# Patient Record
Sex: MALE | Race: ASIAN | Employment: OTHER | URBAN - METROPOLITAN AREA
[De-identification: names, ages, dates, MRNs, and addresses within clinical notes are randomized per-mention and may not be internally consistent; named-entity substitution may affect disease eponyms.]

---

## 2017-02-26 DIAGNOSIS — I10 ESSENTIAL HYPERTENSION WITH GOAL BLOOD PRESSURE LESS THAN 140/90: Primary | ICD-10-CM

## 2017-02-26 NOTE — TELEPHONE ENCOUNTER
Synthroid 88mcg     Last Written Prescription Date: 01-  Last Quantity: 90, # refills: 3  Last Office Visit with [facility], [facility] or [facility] prescribing provider: 10-        TSH   Date Value Ref Range Status   06/02/2016 0.81 0.40 - 4.00 mU/L Final     Aspirin      Last Written Prescription Date: 10-  Last Fill Quantity: 90,  # refills: 3   Last Office Visit with [facility], [facility] or [facility] prescribing provider: 10-    If this refill is appropriate for the patient, please send a new RX; if this is not appropriate or the patient needs to be seen, please call the patient.  Thank you  Tonya Peck CPht    [facility]  Phone: [phone]  Fax: [phone]

## 2017-02-27 DIAGNOSIS — E03.9 HYPOTHYROIDISM: ICD-10-CM

## 2017-02-27 NOTE — TELEPHONE ENCOUNTER
aspirin  MG tablet      Last Written Prescription Date: 10/20/15  Last Fill Quantity: 90, # refills: 3  Last Office Visit with [facility], [facility] or [facility] prescribing provider: 10/20/16        BP Readings from Last 3 Encounters:   10/20/16 136/70   06/02/16 104/65   11/12/15 133/82     Lab Results   Component Value Date    AST 34 06/02/2016     Lab Results   Component Value Date    ALT 31 06/02/2016     Creatinine   Date Value Ref Range Status   06/02/2016 1.29 (H) 0.66 - 1.25 mg/dL Final       levothyroxine (SYNTHROID, LEVOTHROID) 88 MCG tablet     Last Written Prescription Date: 01/21/16  Last Quantity: 90, # refills: 3  Last Office Visit with [facility], [facility] or [facility] prescribing provider: 10/20/16        TSH   Date Value Ref Range Status   06/02/2016 0.81 0.40 - 4.00 mU/L Final         [facility]

## 2017-02-28 RX ORDER — LEVOTHYROXINE SODIUM 88 UG/1
88 TABLET ORAL DAILY
Qty: 90 TABLET | Refills: 1 | Status: SHIPPED | OUTPATIENT
Start: 2017-02-28 | End: 2017-05-10

## 2017-02-28 NOTE — TELEPHONE ENCOUNTER
Routing refill request to provider for review/approval because:  A break in medication- asa    Synthroid refilled per protocol.    JOHNATHAN Tellez, Clinical RN [facility].

## 2017-03-01 RX ORDER — LEVOTHYROXINE SODIUM 88 UG/1
TABLET ORAL
Qty: 90 TABLET | Refills: 0 | OUTPATIENT
Start: 2017-03-01

## 2017-04-05 DIAGNOSIS — G20.A1 PD (PARKINSON'S DISEASE) (H): ICD-10-CM

## 2017-04-05 NOTE — TELEPHONE ENCOUNTER
Sinemet      Last Written Prescription Date: 11/12/2015  Last Fill Quantity: 135, # refills: 3  Last Office Visit with [facility], [facility] or [facility] prescribing provider: 11/12/2015        BP Readings from Last 3 Encounters:   10/20/16 136/70   06/02/16 104/65   11/12/15 133/82     Thank you very kindly!  Celia Enamorado [facility] [place]

## 2017-04-07 ENCOUNTER — CARE COORDINATION (OUTPATIENT)
Dept: GERIATRIC MEDICINE | Facility: CLINIC | Age: 82
End: 2017-04-07

## 2017-04-07 RX ORDER — CARBIDOPA AND LEVODOPA 25; 100 MG/1; MG/1
0.5 TABLET ORAL 3 TIMES DAILY
Qty: 135 TABLET | Refills: 0 | Status: SHIPPED | OUTPATIENT
Start: 2017-04-07 | End: 2018-05-31

## 2017-04-07 NOTE — PROGRESS NOTES
Yazmin So,    Client is requesting for adult diapers which I have sent the referral to [facility].  They will be sending you a request for doctor order from you.  If you agree with this request please sign the order and fax it back to [facility].      Thank you,    Farhad Mitchell RN, PHN  [facility]   Phone: [phone]  Fax [phone]  [email or web address]

## 2017-05-01 ENCOUNTER — CARE COORDINATION (OUTPATIENT)
Dept: GERIATRIC MEDICINE | Facility: CLINIC | Age: 82
End: 2017-05-01

## 2017-05-02 NOTE — PROGRESS NOTES
[facility] Six-Month Telephone Assessment    6 month telephone assessment completed on 5/1/2017.    ER visits: No  Hospitalizations: No  TCU stays: No  Significant health status changes: None reported.  Falls/Injuries: No  ADL/IADL changes: No  Changes in services: No    Caregiver Assessment follow up:  NA    Goals: See POC in chart for goal progress documentation.      Will see client in 6 months for an annual health risk assessment.   Encouraged client to call CM with any questions or concerns in the meantime.       Farhad Mitchell RN, PHN  [facility]   Phone: [phone]  Fax [phone]  [email or web address]

## 2017-05-10 ENCOUNTER — OFFICE VISIT (OUTPATIENT)
Dept: FAMILY MEDICINE | Facility: CLINIC | Age: 82
End: 2017-05-10
Payer: COMMERCIAL

## 2017-05-10 VITALS
BODY MASS INDEX: 25.05 KG/M2 | DIASTOLIC BLOOD PRESSURE: 60 MMHG | WEIGHT: 159.6 LBS | SYSTOLIC BLOOD PRESSURE: 140 MMHG | HEIGHT: 67 IN | RESPIRATION RATE: 17 BRPM | HEART RATE: 68 BPM | OXYGEN SATURATION: 98 % | TEMPERATURE: 96.5 F

## 2017-05-10 DIAGNOSIS — K59.00 CONSTIPATION, UNSPECIFIED CONSTIPATION TYPE: ICD-10-CM

## 2017-05-10 DIAGNOSIS — E78.5 HYPERLIPIDEMIA LDL GOAL <100: ICD-10-CM

## 2017-05-10 DIAGNOSIS — R21 RASH: ICD-10-CM

## 2017-05-10 DIAGNOSIS — E03.9 HYPOTHYROIDISM, UNSPECIFIED TYPE: ICD-10-CM

## 2017-05-10 DIAGNOSIS — I10 ESSENTIAL HYPERTENSION WITH GOAL BLOOD PRESSURE LESS THAN 140/90: ICD-10-CM

## 2017-05-10 DIAGNOSIS — H04.123 DRY EYES: ICD-10-CM

## 2017-05-10 DIAGNOSIS — E11.59 TYPE 2 DIABETES MELLITUS WITH OTHER CIRCULATORY COMPLICATIONS (H): Primary | ICD-10-CM

## 2017-05-10 LAB
ALBUMIN SERPL-MCNC: 3.6 G/DL (ref 3.4–5)
ALP SERPL-CCNC: 71 U/L (ref 40–150)
ALT SERPL W P-5'-P-CCNC: 41 U/L (ref 0–70)
ANION GAP SERPL CALCULATED.3IONS-SCNC: 11 MMOL/L (ref 3–14)
AST SERPL W P-5'-P-CCNC: 32 U/L (ref 0–45)
BILIRUB SERPL-MCNC: 1.5 MG/DL (ref 0.2–1.3)
BUN SERPL-MCNC: 20 MG/DL (ref 7–30)
CALCIUM SERPL-MCNC: 9.2 MG/DL (ref 8.5–10.1)
CHLORIDE SERPL-SCNC: 107 MMOL/L (ref 94–109)
CHOLEST SERPL-MCNC: 182 MG/DL
CO2 SERPL-SCNC: 25 MMOL/L (ref 20–32)
CREAT SERPL-MCNC: 1.23 MG/DL (ref 0.66–1.25)
CREAT UR-MCNC: 136 MG/DL
GFR SERPL CREATININE-BSD FRML MDRD: 54 ML/MIN/1.7M2
GLUCOSE SERPL-MCNC: 133 MG/DL (ref 70–99)
HBA1C MFR BLD: 6.6 % (ref 4.3–6)
HDLC SERPL-MCNC: 85 MG/DL
LDLC SERPL CALC-MCNC: 78 MG/DL
MICROALBUMIN UR-MCNC: 31 MG/L
MICROALBUMIN/CREAT UR: 22.65 MG/G CR (ref 0–17)
NONHDLC SERPL-MCNC: 97 MG/DL
POTASSIUM SERPL-SCNC: 4.4 MMOL/L (ref 3.4–5.3)
PROT SERPL-MCNC: 8.1 G/DL (ref 6.8–8.8)
SODIUM SERPL-SCNC: 143 MMOL/L (ref 133–144)
T4 FREE SERPL-MCNC: 0.81 NG/DL (ref 0.76–1.46)
TRIGL SERPL-MCNC: 97 MG/DL
TSH SERPL DL<=0.005 MIU/L-ACNC: 5.77 MU/L (ref 0.4–4)

## 2017-05-10 PROCEDURE — 82043 UR ALBUMIN QUANTITATIVE: CPT | Performed by: FAMILY MEDICINE

## 2017-05-10 PROCEDURE — 84443 ASSAY THYROID STIM HORMONE: CPT | Performed by: FAMILY MEDICINE

## 2017-05-10 PROCEDURE — 83036 HEMOGLOBIN GLYCOSYLATED A1C: CPT | Performed by: FAMILY MEDICINE

## 2017-05-10 PROCEDURE — 99207 C FOOT EXAM  NO CHARGE: CPT | Performed by: FAMILY MEDICINE

## 2017-05-10 PROCEDURE — 80061 LIPID PANEL: CPT | Performed by: FAMILY MEDICINE

## 2017-05-10 PROCEDURE — 99213 OFFICE O/P EST LOW 20 MIN: CPT | Performed by: FAMILY MEDICINE

## 2017-05-10 PROCEDURE — 84439 ASSAY OF FREE THYROXINE: CPT | Performed by: FAMILY MEDICINE

## 2017-05-10 PROCEDURE — 80053 COMPREHEN METABOLIC PANEL: CPT | Performed by: FAMILY MEDICINE

## 2017-05-10 PROCEDURE — 36415 COLL VENOUS BLD VENIPUNCTURE: CPT | Performed by: FAMILY MEDICINE

## 2017-05-10 RX ORDER — AMOXICILLIN 250 MG
1 CAPSULE ORAL 2 TIMES DAILY
Qty: 180 TABLET | Refills: 3 | Status: SHIPPED | OUTPATIENT
Start: 2017-05-10 | End: 2018-05-31

## 2017-05-10 RX ORDER — POLYETHYLENE GLYCOL 3350 17 G/17G
1 POWDER, FOR SOLUTION ORAL DAILY
Qty: 510 G | Refills: 11 | Status: SHIPPED | OUTPATIENT
Start: 2017-05-10 | End: 2019-01-01

## 2017-05-10 RX ORDER — CARBOXYMETHYLCELLULOSE SODIUM 5 MG/ML
1 SOLUTION/ DROPS OPHTHALMIC 3 TIMES DAILY PRN
Qty: 3 BOTTLE | Refills: 3 | Status: SHIPPED | OUTPATIENT
Start: 2017-05-10 | End: 2019-01-01

## 2017-05-10 RX ORDER — TRIAMCINOLONE ACETONIDE 1 MG/G
CREAM TOPICAL
Qty: 30 G | Refills: 3 | Status: SHIPPED | OUTPATIENT
Start: 2017-05-10 | End: 2018-05-31

## 2017-05-10 RX ORDER — LEVOTHYROXINE SODIUM 88 UG/1
88 TABLET ORAL DAILY
Qty: 90 TABLET | Refills: 3 | Status: SHIPPED | OUTPATIENT
Start: 2017-05-10 | End: 2018-05-31

## 2017-05-10 RX ORDER — LISINOPRIL 10 MG/1
10 TABLET ORAL DAILY
Qty: 90 TABLET | Refills: 1 | Status: SHIPPED | OUTPATIENT
Start: 2017-05-10 | End: 2017-10-25

## 2017-05-10 ASSESSMENT — ANXIETY QUESTIONNAIRES
GAD7 TOTAL SCORE: 5
6. BECOMING EASILY ANNOYED OR IRRITABLE: NOT AT ALL
5. BEING SO RESTLESS THAT IT IS HARD TO SIT STILL: SEVERAL DAYS
2. NOT BEING ABLE TO STOP OR CONTROL WORRYING: SEVERAL DAYS
1. FEELING NERVOUS, ANXIOUS, OR ON EDGE: MORE THAN HALF THE DAYS
IF YOU CHECKED OFF ANY PROBLEMS ON THIS QUESTIONNAIRE, HOW DIFFICULT HAVE THESE PROBLEMS MADE IT FOR YOU TO DO YOUR WORK, TAKE CARE OF THINGS AT HOME, OR GET ALONG WITH OTHER PEOPLE: NOT DIFFICULT AT ALL
7. FEELING AFRAID AS IF SOMETHING AWFUL MIGHT HAPPEN: NOT AT ALL
3. WORRYING TOO MUCH ABOUT DIFFERENT THINGS: NOT AT ALL

## 2017-05-10 ASSESSMENT — PAIN SCALES - GENERAL: PAINLEVEL: NO PAIN (0)

## 2017-05-10 ASSESSMENT — PATIENT HEALTH QUESTIONNAIRE - PHQ9: 5. POOR APPETITE OR OVEREATING: SEVERAL DAYS

## 2017-05-10 NOTE — NURSING NOTE
"Chief Complaint   Patient presents with     Recheck Medication     discuss health     no BM's      interrpreter     Corrina with KTTS        Initial /60 (BP Location: Right arm, Patient Position: Chair, Cuff Size: Adult Large)  Pulse 68  Temp 96.5  F (35.8  C) (Oral)  Resp 17  Ht 5' 6.5\" (1.689 m)  Wt 159 lb 9.6 oz (72.4 kg)  SpO2 98%  BMI 25.37 kg/m2 Estimated body mass index is 25.37 kg/(m^2) as calculated from the following:    Height as of this encounter: 5' 6.5\" (1.689 m).    Weight as of this encounter: 159 lb 9.6 oz (72.4 kg).  Medication Reconciliation: complete   Lorelei Patterson CMA      "

## 2017-05-10 NOTE — PROGRESS NOTES
SUBJECTIVE:                                                    Low Valadez is a 89 year old male who presents to clinic today for the following health issues:      Constipation     Onset: days      Description:   Frequency of bowel movements: 24 hours.  Stool consistency: soft    Progression of Symptoms:  same    Accompanying Signs & Symptoms:  Abdominal pain (cramping?): no   Blood in stool: no   Rectal pain: no   Nausea/vomiting: no   Weight loss or gain: no    History:   History of abdominal surgery: no     Precipitating factors:   Recent use of narcotics, anticholinergics, calcium channel blockers, antacids, or iron supplements: no   Chronic Laxative Use: no          Therapies Tried and outcome: prune juice     Discuss pt. hlth       Problem list and histories reviewed & adjusted, as indicated.  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 stenosis of left middle cerebral artery     Essential hypertension with goal blood pressure less than 140/90     Constipation, unspecified constipation type     Hypothyroidism, unspecified type     Past Surgical History:   Procedure Laterality Date     NO HISTORY OF SURGERY         Social History   Substance Use Topics     Smoking status: Never Smoker     Smokeless tobacco: Never Used     Alcohol use No     Family History   Problem Relation Age of Onset     CANCER No family hx of      DIABETES No family hx of      Hypertension No family hx of       "CEREBROVASCULAR DISEASE No family hx of      Glaucoma No family hx of      Macular Degeneration No family hx of      Thyroid Disease Other            Reviewed and updated as needed this visit by clinical staff  Tobacco  Allergies  Meds       Reviewed and updated as needed this visit by Provider         ROS:  Constitutional, HEENT, cardiovascular, pulmonary, GI, , musculoskeletal, neuro, skin, endocrine and psych systems are negative, except as otherwise noted.    OBJECTIVE:                                                    /60 (BP Location: Right arm, Patient Position: Chair, Cuff Size: Adult Large)  Pulse 68  Temp 96.5  F (35.8  C) (Oral)  Resp 17  Ht 5' 6.5\" (1.689 m)  Wt 159 lb 9.6 oz (72.4 kg)  SpO2 98%  BMI 25.37 kg/m2  Body mass index is 25.37 kg/(m^2).  GENERAL: healthy, alert and no distress  NECK: no adenopathy, no asymmetry, masses, or scars and thyroid normal to palpation  RESP: lungs clear to auscultation - no rales, rhonchi or wheezes  CV: regular rate and rhythm, normal S1 S2, no S3 or S4, no murmur, click or rub, no peripheral edema and peripheral pulses strong  ABDOMEN: soft, nontender, no hepatosplenomegaly, no masses and bowel sounds normal  MS: no gross musculoskeletal defects noted, no edema    Diagnostic Test Results:  none      ASSESSMENT/PLAN:                                                      1. Type 2 diabetes mellitus with other circulatory complications (H)  Controlled. RTC in 6 months.  - HEMOGLOBIN A1C  - Comprehensive metabolic panel (BMP + Alb, Alk Phos, ALT, AST, Total. Bili, TP)  - Albumin Random Urine Quantitative  - FOOT EXAM    2. Essential hypertension with goal blood pressure less than 140/90  Controlled. Reviewed low salt diet.  - Comprehensive metabolic panel (BMP + Alb, Alk Phos, ALT, AST, Total. Bili, TP)  - Albumin Random Urine Quantitative  - lisinopril (PRINIVIL/ZESTRIL) 10 MG tablet; Take 1 tablet (10 mg) by mouth daily  Dispense: 90 tablet; Refill: " 1  - levothyroxine (SYNTHROID/LEVOTHROID) 88 MCG tablet; Take 1 tablet (88 mcg) by mouth daily  Dispense: 90 tablet; Refill: 3    3. Hyperlipidemia LDL goal <100  Controlled.  - Lipid Profile with reflex to direct LDL    4. Constipation, unspecified constipation type  Stable.  - polyethylene glycol (MIRALAX) powder; Take 17 g (1 capful) by mouth daily As needed  Dispense: 510 g; Refill: 11  - senna-docusate (SENOKOT-S;PERICOLACE) 8.6-50 MG per tablet; Take 1 tablet by mouth 2 times daily  Dispense: 180 tablet; Refill: 3    5. Hypothyroidism, unspecified type  Euthyroid.  - TSH with free T4 reflex    6. Rash    - triamcinolone (KENALOG) 0.1 % cream; Apply sparingly to affected area three times daily for 14 days.  Dispense: 30 g; Refill: 3    7. Dry eyes    - carboxymethylcellulose (REFRESH PLUS) 0.5 % SOLN ophthalmic solution; Place 1 drop into both eyes 3 times daily as needed for dry eyes  Dispense: 3 Bottle; Refill: 3    See Patient Instructions    Clif So MD, MD  [facility]

## 2017-05-10 NOTE — MR AVS SNAPSHOT
"              After Visit Summary   5/10/2017    Low Valadez    MRN: [id number]           Patient Information     Date Of Birth          2/4/1918        Visit Information        Provider Department      5/10/2017 9:15 AM Clif So MD; [employer] [facility]        Today's Diagnoses     Type 2 diabetes mellitus with other circulatory complications (H)    -  1    Essential hypertension with goal blood pressure less than 140/90        Hyperlipidemia LDL goal <100        Constipation, unspecified constipation type        Hypothyroidism, unspecified type        Rash        Dry eyes           Follow-ups after your visit        Who to contact     If you have questions or need follow up information about today's clinic visit or your schedule please contact [facility] directly at [phone].  Normal or non-critical lab and imaging results will be communicated to you by [employer], letter or phone within 4 business days after the clinic has received the results. If you do not hear from us within 7 days, please contact the clinic through [employer] or phone. If you have a critical or abnormal lab result, we will notify you by phone as soon as possible.  Submit refill requests through [employer] or call your pharmacy and they will forward the refill request to us. Please allow 3 business days for your refill to be completed.          Additional Information About Your Visit        MyChart Information     [employer] lets you send messages to your doctor, view your test results, renew your prescriptions, schedule appointments and more. To sign up, go to [email or web address] . Click on \"Log in\" on the left side of the screen, which will take you to the Welcome page. Then click on \"Sign up Now\" on the right side of the page.     You will be asked to enter the access code listed below, as well as some personal information. Please follow the directions to create your username and " "password.     Your access code is: [email or web address]  Expires: 2017  9:55 AM     Your access code will  in 90 days. If you need help or a new code, please call your [facility] clinic or [phone].        Care EveryWhere ID     This is your Care EveryWhere ID. This could be used by other organizations to access your [facility] medical records  [id number]        Your Vitals Were     Pulse Temperature Respirations Height Pulse Oximetry BMI (Body Mass Index)    68 96.5  F (35.8  C) (Oral) 17 5' 6.5\" (1.689 m) 98% 25.37 kg/m2       Blood Pressure from Last 3 Encounters:   05/10/17 140/60   10/20/16 136/70   16 104/65    Weight from Last 3 Encounters:   05/10/17 159 lb 9.6 oz (72.4 kg)   10/20/16 156 lb 6.4 oz (70.9 kg)   16 156 lb (70.8 kg)              We Performed the Following     Albumin Random Urine Quantitative     Comprehensive metabolic panel (BMP + Alb, Alk Phos, ALT, AST, Total. Bili, TP)     FOOT EXAM     HEMOGLOBIN A1C     Lipid Profile with reflex to direct LDL     TSH with free T4 reflex          Today's Medication Changes          These changes are accurate as of: 5/10/17  9:57 AM.  If you have any questions, ask your nurse or doctor.               Start taking these medicines.        Dose/Directions    carboxymethylcellulose 0.5 % Soln ophthalmic solution   Commonly known as:  REFRESH PLUS   Used for:  Dry eyes   Started by:  Clif So MD        Dose:  1 drop   Place 1 drop into both eyes 3 times daily as needed for dry eyes   Quantity:  3 Bottle   Refills:  3       triamcinolone 0.1 % cream   Commonly known as:  KENALOG   Used for:  Rash   Started by:  Clif So MD        Apply sparingly to affected area three times daily for 14 days.   Quantity:  30 g   Refills:  3            Where to get your medicines      These medications were sent to [facility] - [place], [place] - [address]  [address], [facility] [address]     Phone:  [phone]     " carboxymethylcellulose 0.5 % Soln ophthalmic solution    levothyroxine 88 MCG tablet    lisinopril 10 MG tablet    polyethylene glycol powder    senna-docusate 8.6-50 MG per tablet    triamcinolone 0.1 % cream                Primary Care Provider Office Phone # Fax #    Clif So [phone]       [facility] [address]  [facility] [address]        Thank you!     Thank you for choosing [facility]  for your care. Our goal is always to provide you with excellent care. Hearing back from our patients is one way we can continue to improve our services. Please take a few minutes to complete the written survey that you may receive in the mail after your visit with us. Thank you!             Your Updated Medication List - Protect others around you: Learn how to safely use, store and throw away your medicines at www.disposemymeds.org.          This list is accurate as of: 5/10/17  9:57 AM.  Always use your most recent med list.                   Brand Name Dispense Instructions for use    acetaminophen-codeine 300-30 MG per tablet    TYLENOL #3    60 tablet    Take 1 tablet by mouth every 6 hours as needed for pain       aspirin  MG EC tablet     90 tablet    Take 1 tablet (325 mg) by mouth daily       carbidopa-levodopa  MG per tablet    SINEMET    135 tablet    Take 0.5 tablets by mouth 3 times daily       carboxymethylcellulose 0.5 % Soln ophthalmic solution    REFRESH PLUS    3 Bottle    Place 1 drop into both eyes 3 times daily as needed for dry eyes       levothyroxine 88 MCG tablet    SYNTHROID/LEVOTHROID    90 tablet    Take 1 tablet (88 mcg) by mouth daily       lisinopril 10 MG tablet    PRINIVIL/ZESTRIL    90 tablet    Take 1 tablet (10 mg) by mouth daily       polyethylene glycol powder    MIRALAX    510 g    Take 17 g (1 capful) by mouth daily As needed       senna-docusate 8.6-50 MG per tablet    SENOKOT-S;PERICOLACE    180 tablet    Take 1  tablet by mouth 2 times daily       STATIN NOT PRESCRIBED (INTENTIONAL)     0 each    Statin not prescribed intentionally due to Other not needed due to difficulty swallowing medications (This option does not exclude patient from measure)       triamcinolone 0.1 % cream    KENALOG    30 g    Apply sparingly to affected area three times daily for 14 days.

## 2017-05-11 ASSESSMENT — PATIENT HEALTH QUESTIONNAIRE - PHQ9: SUM OF ALL RESPONSES TO PHQ QUESTIONS 1-9: 17

## 2017-05-11 ASSESSMENT — ANXIETY QUESTIONNAIRES: GAD7 TOTAL SCORE: 5

## 2017-07-17 ENCOUNTER — TELEPHONE (OUTPATIENT)
Dept: FAMILY MEDICINE | Facility: CLINIC | Age: 82
End: 2017-07-17

## 2017-07-17 NOTE — LETTER
[address] [address]  Phone: [phone]    July 18, 2017        Low Valadez  [address] [place] [address]          To whom it may concern:    RE: Low Valadez    Patient is currently a patient of mine. I do confirm that patient currently lives at [address] [place] [address].    Please contact me for questions or concerns.      Sincerely,        Clif So MD

## 2017-07-17 NOTE — TELEPHONE ENCOUNTER
Reason for Call:  Other Letter    Detailed comments:   Pt's Daughter calling for she would like Dr. So to write a letter to the IRS stating Pt does live at his address on file from 2014 to present. Pt's son  would like to come to clinic to  as soon as flakoe. Please     Phone Number Patient can be reached at: Cell number on file:  [phone]   Best Time: anytime    Can we leave a detailed message on this number? YES    Call taken on 7/17/2017 at 3:03 PM by Riki Rodríguez

## 2017-07-18 NOTE — TELEPHONE ENCOUNTER
Dr. So I checked in demographics on pt's [place] ID Card that was scanned in 2014, the ID card was issued on  with the address we have in EPIC. You can write a letter/statement for pt to verify that pt was at the address [address] in [facility].  Once letter is complete and signed pls put the letter in TC basket to call pt's son to  from the .  Asaf Valadez,  [facility]

## 2017-07-18 NOTE — TELEPHONE ENCOUNTER
Letter for pt will be deliver to the  this afternoon for pickup after 3pm.  Asaf Valadez,  [facility]    Please call Anibal to let them know the above info.  And remind the person who is picking up to bring their photo ID.  Asaf Valadez,  [facility]     NOTE: If patient/gaurdian calls the clinic before the care teams gets a chance to call them, please let pt know the above information regarding pickup times, remind them to bring a photo ID and close the encounter.  Asaf Valadez,  [facility]    FYI:  Anything completed after 2:00p will not be delivered until the next business day after 11a.   Asaf Valadez,  for [facility].

## 2017-09-05 ENCOUNTER — CARE COORDINATION (OUTPATIENT)
Dept: GERIATRIC MEDICINE | Facility: CLINIC | Age: 82
End: 2017-09-05

## 2017-09-05 NOTE — PROGRESS NOTES
Call placed to client to schedule a home visit appointment to complete the annual HRA and PCA.  A home visit has been scheduled for 9/13/2017 at 10:00 am.     Farhad Mitchell RN, N  [facility]   Phone: [phone]  Fax [phone]  [email or web address]

## 2017-09-13 ENCOUNTER — CARE COORDINATION (OUTPATIENT)
Dept: GERIATRIC MEDICINE | Facility: CLINIC | Age: 82
End: 2017-09-13

## 2017-09-13 ASSESSMENT — PATIENT HEALTH QUESTIONNAIRE - PHQ9: SUM OF ALL RESPONSES TO PHQ QUESTIONS 1-9: 9

## 2017-09-19 ENCOUNTER — CARE COORDINATION (OUTPATIENT)
Dept: GERIATRIC MEDICINE | Facility: CLINIC | Age: 82
End: 2017-09-19

## 2017-09-19 NOTE — PROGRESS NOTES
[facility]:  [facility]  Faxed copy of PCA assessment to PCA Agency and mailed copy to member.  Faxed MD Communication to PCP.       Angeles Talbert  Case Management Specialist  [facility]   [phone]

## 2017-10-12 ENCOUNTER — CARE COORDINATION (OUTPATIENT)
Dept: GERIATRIC MEDICINE | Facility: CLINIC | Age: 82
End: 2017-10-12

## 2017-10-12 NOTE — PROGRESS NOTES
Received after visit chart from care coordinator.  Completed following tasks: Mailed copy of care plan to client  Updated services in access  Entered MMIS  Chart was returned to CC.     Angeles Talbert  Case Management Specialist  [facility]   [phone]

## 2017-10-12 NOTE — PROGRESS NOTES
Home visit/Papa Risk Assessment/EW screening completed on: 9/13/2017.    Member resides: in a split entry home with sonAnibal and his family (multi-generational).  Member Mood/behavior-PHQ9 score: 9 [0-9: No Major Depression]  See EMR for a list of client's diagnoses and medications.  Medication management:  Medication reviewed and stated understanding and compliance, family assist with medication set up and medication administration.   [facility]: Shared information regarding the [employer] and [facility]  Fitness Program:  Client not interested at this time.  Member currently receiving the following services: PCA, nutritional supplements and incontinence supply.  Plan of Care: Continue the current services client has in place.   Follow-Up Plan: Member informed of future contact, plan to f/u with member with a 6 month telephone assessment.  Contact information shared with member and family, encouraged member to call with any questions or concerns prior to this.    See health risk assessment form and care plan for further information.    PCA ASSESSMENT:  Writer also completed the annual PCA assessment with a result of 10 hours per day.    Farhad Mitchell RN, PHN  [facility]   Phone: [phone]  Fax [phone]  [email or web address]

## 2017-10-13 ENCOUNTER — CARE COORDINATION (OUTPATIENT)
Dept: GERIATRIC MEDICINE | Facility: CLINIC | Age: 82
End: 2017-10-13

## 2017-10-13 NOTE — PROGRESS NOTES
Order placed with [employer] (p: [phone]; f: [phone]) for 42 bottles of glucerna.  Order placed on 10/13/2017. Access updated.  As required, authorization submitted to health plan.    Angeles Talbert  Case Management Specialist  [facility]   [phone]

## 2017-10-25 ENCOUNTER — OFFICE VISIT (OUTPATIENT)
Dept: FAMILY MEDICINE | Facility: CLINIC | Age: 82
End: 2017-10-25
Payer: COMMERCIAL

## 2017-10-25 VITALS
SYSTOLIC BLOOD PRESSURE: 107 MMHG | OXYGEN SATURATION: 97 % | DIASTOLIC BLOOD PRESSURE: 57 MMHG | HEART RATE: 67 BPM | TEMPERATURE: 98.7 F

## 2017-10-25 DIAGNOSIS — E11.9 TYPE 2 DIABETES MELLITUS WITHOUT COMPLICATION, WITHOUT LONG-TERM CURRENT USE OF INSULIN (H): Primary | ICD-10-CM

## 2017-10-25 DIAGNOSIS — Z23 ENCOUNTER FOR IMMUNIZATION: ICD-10-CM

## 2017-10-25 DIAGNOSIS — E78.5 HYPERLIPIDEMIA LDL GOAL <100: ICD-10-CM

## 2017-10-25 DIAGNOSIS — I10 ESSENTIAL HYPERTENSION WITH GOAL BLOOD PRESSURE LESS THAN 140/90: ICD-10-CM

## 2017-10-25 LAB — HBA1C MFR BLD: 6.2 % (ref 4.3–6)

## 2017-10-25 PROCEDURE — 83036 HEMOGLOBIN GLYCOSYLATED A1C: CPT | Performed by: FAMILY MEDICINE

## 2017-10-25 PROCEDURE — G0008 ADMIN INFLUENZA VIRUS VAC: HCPCS | Performed by: FAMILY MEDICINE

## 2017-10-25 PROCEDURE — 90662 IIV NO PRSV INCREASED AG IM: CPT | Performed by: FAMILY MEDICINE

## 2017-10-25 PROCEDURE — G0009 ADMIN PNEUMOCOCCAL VACCINE: HCPCS | Performed by: FAMILY MEDICINE

## 2017-10-25 PROCEDURE — 36415 COLL VENOUS BLD VENIPUNCTURE: CPT | Performed by: FAMILY MEDICINE

## 2017-10-25 PROCEDURE — 90670 PCV13 VACCINE IM: CPT | Performed by: FAMILY MEDICINE

## 2017-10-25 PROCEDURE — 99214 OFFICE O/P EST MOD 30 MIN: CPT | Mod: 25 | Performed by: FAMILY MEDICINE

## 2017-10-25 RX ORDER — LISINOPRIL 10 MG/1
10 TABLET ORAL DAILY
Qty: 90 TABLET | Refills: 1 | Status: SHIPPED | OUTPATIENT
Start: 2017-10-25 | End: 2018-05-31

## 2017-10-25 NOTE — PATIENT INSTRUCTIONS
At [facility], we strive to deliver an exceptional experience to you, every time we see you.  If you receive a survey in the mail, please send us back your thoughts. We really do value your feedback.    Based on your medical history, these are the current health maintenance/preventive care services that you are due for (some may have been done at this visit.)  Health Maintenance Due   Topic Date Due     PNEUMOCOCCAL (2 of 2 - PCV13) 03/24/2010     EYE EXAM Q1 YEAR  10/22/2013     ADVANCE DIRECTIVE PLANNING Q5 YRS  10/04/2016     INFLUENZA VACCINE (SYSTEM ASSIGNED)  09/01/2017     A1C Q6 MO  11/10/2017         Suggested websites for health information:  Www.DwellAware.org : Up to date and easily searchable information on multiple topics.  Www.[place].gov : medication info, interactive tutorials, watch real surgeries online  Www.familydoctor.org : good info from the Academy of Family Physicians  Www.cdc.gov : public health info, travel advisories, epidemics (H1N1)  Www.aap.org : children's health info, normal development, vaccinations  Www.health.[facility].[place].us : [place] dept of health, public health issues in [place], N1N1    Your care team:                            Family Medicine Internal Medicine   MD Bishop Otto MD Shantel Branch-Fleming, MD Katya Georgiev PA-C Nam Ho, MD Pediatrics   LESLIE Santiago, MD Dinorah Sequeira CNP, MD Deborah Mielke, MD Kim Thein, APRN [facility]      Clinic hours: Monday - Thursday 7 am-7 pm; Fridays 7 am-5 pm.   Urgent care: Monday - Friday 11 am-9 pm; Saturday and Sunday 9 am-5 pm.  Pharmacy : Monday -Thursday 8 am-8 pm; Friday 8 am-6 pm; Saturday and Sunday 9 am-5 pm.     Clinic: [phone]   Pharmacy: [phone]

## 2017-10-25 NOTE — MR AVS SNAPSHOT
After Visit Summary   10/25/2017    Low Valadez    MRN: [id number]           Patient Information     Date Of Birth          2/4/1918        Visit Information        Provider Department      10/25/2017 10:15 AM Clif So MD; [employer] [facility]        Today's Diagnoses     Type 2 diabetes mellitus without complication, without long-term current use of insulin (H)    -  1    Essential hypertension with goal blood pressure less than 140/90        Hyperlipidemia LDL goal <100        Encounter for immunization          Care Instructions    At [facility], we strive to deliver an exceptional experience to you, every time we see you.  If you receive a survey in the mail, please send us back your thoughts. We really do value your feedback.    Based on your medical history, these are the current health maintenance/preventive care services that you are due for (some may have been done at this visit.)  Health Maintenance Due   Topic Date Due     PNEUMOCOCCAL (2 of 2 - PCV13) 03/24/2010     EYE EXAM Q1 YEAR  10/22/2013     ADVANCE DIRECTIVE PLANNING Q5 YRS  10/04/2016     INFLUENZA VACCINE (SYSTEM ASSIGNED)  09/01/2017     A1C Q6 MO  11/10/2017         Suggested websites for health information:  Www.[employer] : Up to date and easily searchable information on multiple topics.  Www.medlineplus.gov : medication info, interactive tutorials, watch real surgeries online  Www.familydoctor.org : good info from the Academy of Family Physicians  Www.cdc.gov : public health info, travel advisories, epidemics (H1N1)  Www.aap.org : children's health info, normal development, vaccinations  Www.health.state[place].us : [place] dept of health, public health issues in [place], N1N1    Your care team:                            Family Medicine Internal Medicine   MD Bishop Otto MD Shantel Branch-Fleming, MD Katya Georgiev PA-C Nam Ho, MD Pediatrics   Zeeshan  "LESLIE Terrazas, MD Dinorah Sequeira CNP, MD Deborah Mielke, MD Kim Thein, JACINTO [facility]      Clinic hours: Monday - Thursday 7 am-7 pm;  7 am-5 pm.   Urgent care: Monday - Friday 11 am-9 pm; Saturday and  9 am-5 pm.  Pharmacy : Monday -Thursday 8 am-8 pm; Friday 8 am-6 pm; Saturday and  9 am-5 pm.     Clinic: [phone]   Pharmacy: [phone]            Follow-ups after your visit        Who to contact     If you have questions or need follow up information about today's clinic visit or your schedule please contact [facility] [place] directly at [phone].  Normal or non-critical lab and imaging results will be communicated to you by MyChart, letter or phone within 4 business days after the clinic has received the results. If you do not hear from us within 7 days, please contact the clinic through [employer] or phone. If you have a critical or abnormal lab result, we will notify you by phone as soon as possible.  Submit refill requests through [employer] or call your pharmacy and they will forward the refill request to us. Please allow 3 business days for your refill to be completed.          Additional Information About Your Visit        [employer] Information     [employer] lets you send messages to your doctor, view your test results, renew your prescriptions, schedule appointments and more. To sign up, go to [email or web address] . Click on \"Log in\" on the left side of the screen, which will take you to the Welcome page. Then click on \"Sign up Now\" on the right side of the page.     You will be asked to enter the access code listed below, as well as some personal information. Please follow the directions to create your username and password.     Your access code is: [email or web address]  Expires: 2018 10:35 AM     Your access code will  in 90 days. If you need help or a new code, please call your [facility] or " [phone].        Care EveryWhere ID     This is your Care EveryWhere ID. This could be used by other organizations to access your [place] medical records  [id number]        Your Vitals Were     Pulse Temperature Pulse Oximetry             67 98.7  F (37.1  C) (Tympanic) 97%          Blood Pressure from Last 3 Encounters:   10/25/17 107/57   05/10/17 140/60   10/20/16 136/70    Weight from Last 3 Encounters:   05/10/17 159 lb 9.6 oz (72.4 kg)   10/20/16 156 lb 6.4 oz (70.9 kg)   06/02/16 156 lb (70.8 kg)              We Performed the Following     ADMIN 1st VACCINE     FLU VACCINE, INCREASED ANTIGEN, PRESV FREE     HEMOGLOBIN A1C     JUST IN CASE     Pneumococcal vaccine 13 valent PCV13 IM (Prevnar) [59514]          Where to get your medicines      These medications were sent to [facility] - [place], [place] - [address]  [address], [facility] [address]     Phone:  [phone]     lisinopril 10 MG tablet          Primary Care Provider Office Phone # Fax #    Clif So [phone]       [address]  [facility] [address]        Equal Access to Services     KWESI SIMS : Hadii merna ku hadasho Soomaali, waaxda luqadaha, qaybta kaalmada adeegyada, waxay idiin hayalessandro oneill. So [facility] [phone].    ATENCIÓN: Si habla español, tiene a chandler disposición servicios gratuitos de asistencia lingüística. Llame al [phone].    We comply with applicable federal civil rights laws and [place] laws. We do not discriminate on the basis of race, color, national origin, age, disability, sex, sexual orientation, or gender identity.            Thank you!     Thank you for choosing [facility]  for your care. Our goal is always to provide you with excellent care. Hearing back from our patients is one way we can continue to improve our services. Please take a few minutes to complete the written survey that you may receive in the mail  after your visit with us. Thank you!             Your Updated Medication List - Protect others around you: Learn how to safely use, store and throw away your medicines at www.disposemymeds.org.          This list is accurate as of: 10/25/17 10:36 AM.  Always use your most recent med list.                   Brand Name Dispense Instructions for use Diagnosis    acetaminophen-codeine 300-30 MG per tablet    TYLENOL #3    60 tablet    Take 1 tablet by mouth every 6 hours as needed for pain    Abdominal pain, RLQ (right lower quadrant)       aspirin  MG EC tablet     90 tablet    Take 1 tablet (325 mg) by mouth daily    Essential hypertension with goal blood pressure less than 140/90       carbidopa-levodopa  MG per tablet    SINEMET    135 tablet    Take 0.5 tablets by mouth 3 times daily    PD (Parkinson's disease) (H)       carboxymethylcellulose 0.5 % Soln ophthalmic solution    REFRESH PLUS    3 Bottle    Place 1 drop into both eyes 3 times daily as needed for dry eyes    Dry eyes       levothyroxine 88 MCG tablet    SYNTHROID/LEVOTHROID    90 tablet    Take 1 tablet (88 mcg) by mouth daily    Hypothyroidism, unspecified type       lisinopril 10 MG tablet    PRINIVIL/ZESTRIL    90 tablet    Take 1 tablet (10 mg) by mouth daily    Essential hypertension with goal blood pressure less than 140/90       polyethylene glycol powder    MIRALAX    510 g    Take 17 g (1 capful) by mouth daily As needed    Constipation, unspecified constipation type       senna-docusate 8.6-50 MG per tablet    SENOKOT-S;PERICOLACE    180 tablet    Take 1 tablet by mouth 2 times daily    Constipation, unspecified constipation type       STATIN NOT PRESCRIBED (INTENTIONAL)     0 each    Statin not prescribed intentionally due to Other not needed due to difficulty swallowing medications (This option does not exclude patient from measure)        triamcinolone 0.1 % cream    KENALOG    30 g    Apply sparingly to affected area three  times daily for 14 days.    Rash

## 2017-10-25 NOTE — NURSING NOTE
Injectable Influenza Immunization Documentation    1.  Are you sick today? (Fever of 100.5 or higher on the day of the clinic)   No    2.  Have you ever had Guillain-Madison Syndrome within 6 weeks of an influenza vaccionation?  No    3. Do you have a life-threatening allergy to eggs?  No    4. Do you have a life-threatening allergy to a component of the vaccine? May include antibiotics, gelatin or latex.  No     5. Have you ever had a reaction to a dose of flu vaccine that needed immediate medical attention?  No     Form completed by Neil Murray MA    Screening Questionnaire for Adult Immunization    Are you sick today?   No   Do you have allergies to medications, food, a vaccine component or latex?   No   Have you ever had a serious reaction after receiving a vaccination?   No   Do you have a long-term health problem with heart disease, lung disease, asthma, kidney disease, metabolic disease (e.g. diabetes), anemia, or other blood disorder?   No   Do you have cancer, leukemia, HIV/AIDS, or any other immune system problem?   No   In the past 3 months, have you taken medications that affect  your immune system, such as prednisone, other steroids, or anticancer drugs; drugs for the treatment of rheumatoid arthritis, Crohn s disease, or psoriasis; or have you had radiation treatments?   No   Have you had a seizure, or a brain or other nervous system problem?   No   During the past year, have you received a transfusion of blood or blood     products, or been given immune (gamma) globulin or antiviral drug?   No   For women: Are you pregnant or is there a chance you could become        pregnant during the next month?   No   Have you received any vaccinations in the past 4 weeks?   No     Immunization questionnaire answers were all negative.         Screening performed by Neil Murray on 10/25/2017 at 10:46 AM.

## 2017-10-25 NOTE — PROGRESS NOTES
SUBJECTIVE:   Low Valadez is a 89 year old male who presents to clinic today for the following health issues:      Diabetes Follow-up      Patient is checking blood sugars: not at all    Diabetic concerns: None     Symptoms of hypoglycemia (low blood sugar): none     Paresthesias (numbness or burning in feet) or sores: No     Date of last diabetic eye exam: DUE    Hyperlipidemia Follow-Up      Rate your low fat/cholesterol diet?: good    Taking statin?  No    Other lipid medications/supplements?:  none    Hypertension Follow-up      Outpatient blood pressures are not being checked.    Low Salt Diet: low salt        Amount of exercise or physical activity: None    Problems taking medications regularly: No    Medication side effects: none    Diet: low salt, low fat/cholesterol and diabetic      Problem list and histories reviewed & adjusted, as indicated.  Additional history: as documented    Patient Active Problem List   Diagnosis     Cholelithiases     Kidney stones     Health Care Home     Advanced directives, counseling/discussion     Parkinsonism (H)     Vitamin D deficiency     Vitamin B12 deficiency (non anemic)     Elevated ferritin level     Light-headedness     Dermatochalasis, mod, ou     Posterior vitreous detachment, ou     Cataract     Hyperlipidemia LDL goal <100     Overweight (BMI 25.0-29.9)     Cerebral infarction due to occlusion or stenosis of left middle cerebral artery     Essential hypertension with goal blood pressure less than 140/90     Constipation, unspecified constipation type     Hypothyroidism, unspecified type     Type 2 diabetes mellitus without complication, without long-term current use of insulin (H)     Past Surgical History:   Procedure Laterality Date     NO HISTORY OF SURGERY         Social History   Substance Use Topics     Smoking status: Never Smoker     Smokeless tobacco: Never Used     Alcohol use No     Family History   Problem Relation Age of Onset     CANCER No family  hx of      DIABETES No family hx of      Hypertension No family hx of      CEREBROVASCULAR DISEASE No family hx of      Glaucoma No family hx of      Macular Degeneration No family hx of      Thyroid Disease Other              Reviewed and updated as needed this visit by clinical staffTobacco  Allergies  Meds       Reviewed and updated as needed this visit by Provider         ROS:  Constitutional, HEENT, cardiovascular, pulmonary, GI, , musculoskeletal, neuro, skin, endocrine and psych systems are negative, except as otherwise noted.      OBJECTIVE:   /57 (BP Location: Left arm, Patient Position: Chair, Cuff Size: Adult Regular)  Pulse 67  Temp 98.7  F (37.1  C) (Tympanic)  SpO2 97%  There is no height or weight on file to calculate BMI.  GENERAL: healthy, alert and no distress  NECK: no adenopathy, no asymmetry, masses, or scars and thyroid normal to palpation  RESP: lungs clear to auscultation - no rales, rhonchi or wheezes  CV: regular rate and rhythm, normal S1 S2, no S3 or S4, no murmur, click or rub, no peripheral edema and peripheral pulses strong  ABDOMEN: soft, nontender, no hepatosplenomegaly, no masses and bowel sounds normal  MS: no gross musculoskeletal defects noted, no edema  Diabetic foot exam: normal DP and PT pulses, no trophic changes or ulcerative lesions and normal sensory exam    Diagnostic Test Results:  none     ASSESSMENT/PLAN:     1. Type 2 diabetes mellitus without complication, without long-term current use of insulin (H)  Controlled. RTC in 6 months for recheck.  - HEMOGLOBIN A1C  - JUST IN CASE    2. Essential hypertension with goal blood pressure less than 140/90  Controlled. Reviewed low salt diet.  - lisinopril (PRINIVIL/ZESTRIL) 10 MG tablet; Take 1 tablet (10 mg) by mouth daily  Dispense: 90 tablet; Refill: 1    3. Hyperlipidemia LDL goal <100  Controlled.    4. Encounter for immunization    - ADMIN 1st VACCINE  - FLU VACCINE, INCREASED ANTIGEN, PRESV FREE  -  Pneumococcal vaccine 13 valent PCV13 IM (Prevnar) [71672]    See Patient Instructions    Clif So MD, MD  [facility]

## 2017-10-25 NOTE — NURSING NOTE
"Chief Complaint   Patient presents with     Diabetes     Hypertension     Lipids       Initial /57 (BP Location: Left arm, Patient Position: Chair, Cuff Size: Adult Regular)  Pulse 67  Temp 98.7  F (37.1  C) (Tympanic)  SpO2 97% Estimated body mass index is 25.37 kg/(m^2) as calculated from the following:    Height as of 5/10/17: 5' 6.5\" (1.689 m).    Weight as of 5/10/17: 159 lb 9.6 oz (72.4 kg).  Medication Reconciliation: complete     Corrina Abbott MA      "

## 2017-10-27 ENCOUNTER — MEDICAL CORRESPONDENCE (OUTPATIENT)
Dept: HEALTH INFORMATION MANAGEMENT | Facility: CLINIC | Age: 82
End: 2017-10-27

## 2017-11-01 NOTE — PROGRESS NOTES
Per Vicky, Glucerna was shipped/delivered on 10/30/2017.    Angeles Talbert  Case Management Specialist  [facility]   [phone]

## 2018-01-01 ENCOUNTER — PATIENT OUTREACH (OUTPATIENT)
Dept: GERIATRIC MEDICINE | Facility: CLINIC | Age: 83
End: 2018-01-01

## 2018-01-01 ASSESSMENT — PATIENT HEALTH QUESTIONNAIRE - PHQ9: SUM OF ALL RESPONSES TO PHQ QUESTIONS 1-9: 12

## 2018-03-29 ENCOUNTER — PATIENT OUTREACH (OUTPATIENT)
Dept: GERIATRIC MEDICINE | Facility: CLINIC | Age: 83
End: 2018-03-29

## 2018-03-29 ASSESSMENT — ACTIVITIES OF DAILY LIVING (ADL)
DEPENDENT_IADLS:: CLEANING;COOKING;LAUNDRY;SHOPPING;MEAL PREPARATION;MEDICATION MANAGEMENT;MONEY MANAGEMENT;TRANSPORATION

## 2018-03-29 NOTE — PROGRESS NOTES
[facility] Care Coordination Contact    [facility] Six-Month Telephone Assessment    6 month telephone assessment completed on 3/29/2018.    ER visits: No  Hospitalizations: No  TCU stays: No  Significant health status changes: None reported.  Falls/Injuries: No  ADL/IADL changes: No  Changes in services: No    Caregiver Assessment follow up:      Goals: See POC in chart for goal progress documentation.      Will see member in 6 months for an annual health risk assessment.   Encouraged member to call CC with any questions or concerns in the meantime.     Farhad Mitchell RN, PHN  [facility]   Phone: [phone]  Fax [phone]  [email or web address]

## 2018-05-20 ENCOUNTER — TRANSFERRED RECORDS (OUTPATIENT)
Dept: HEALTH INFORMATION MANAGEMENT | Facility: CLINIC | Age: 83
End: 2018-05-20

## 2018-05-25 ENCOUNTER — TELEPHONE (OUTPATIENT)
Dept: FAMILY MEDICINE | Facility: CLINIC | Age: 83
End: 2018-05-25

## 2018-05-25 ENCOUNTER — PATIENT OUTREACH (OUTPATIENT)
Dept: GERIATRIC MEDICINE | Facility: CLINIC | Age: 83
End: 2018-05-25

## 2018-05-25 NOTE — PROGRESS NOTES
[facility] Care Coordination Contact    Yazmin So    I am the [facility] care coordinator for Low Valadez.  I am writing to inform you that client was  admitted to [facility] on 5/20/2018 for Acute cystitis with Hematuria.  Client was discharged back home on 5/23/12018 with resumption of PCA services.  I reminded client to schedule a post hospital visit with you soon.    All of my documentation can be found in EPIC. You do not have to respond to this message. However, if you have any questions or concerns, please feel free to contact me.     Farhad Mitchell RN, PHN  [facility]   Phone: [phone]  Fax [phone]  [email or web address]

## 2018-05-25 NOTE — TELEPHONE ENCOUNTER
Reason for Call:  Other     Detailed comments: Radha has been trying to reach the patient and his son to set up home care and will try again tomorrow and just to let Dr So there is a delay in starting.    Phone Number Patient can be reached at: Other phone number:    Jacinda / Radha (HOME CARE) [phone]         Best Time: any    Can we leave a detailed message on this number? YES    Call taken on 5/25/2018 at 2:44 PM by Rose Marie Mcnair

## 2018-05-29 ENCOUNTER — TELEPHONE (OUTPATIENT)
Dept: FAMILY MEDICINE | Facility: CLINIC | Age: 83
End: 2018-05-29

## 2018-05-29 PROBLEM — N39.42 URINARY INCONTINENCE WITHOUT SENSORY AWARENESS: Status: ACTIVE | Noted: 2018-05-29

## 2018-05-29 PROBLEM — R15.0 INCOMPLETE DEFECATION: Status: ACTIVE | Noted: 2018-05-29

## 2018-05-29 PROBLEM — R15.0 INCOMPLETE DEFECATION: Status: RESOLVED | Noted: 2018-05-29 | Resolved: 2018-05-29

## 2018-05-29 PROBLEM — R15.9 FULL INCONTINENCE OF FECES: Status: ACTIVE | Noted: 2018-05-29

## 2018-05-29 NOTE — TELEPHONE ENCOUNTER
----- Message from Farhad Mitchell RN sent at 5/25/2018 11:53 AM CDT -----  Regarding: supply  [facility] [address] So,    I received a telephone call from client's son, Anibal, with request for gloves and chux underpads.  He shared that client has been having fecal incontinence in addition to urinary incontinence (He currently is receiving adult diapers). I sent the referral to [facility] which they will be sending you a request for orders.  Medical assistance will cover gloves only if client requires it for open wound cares or has fecal incontinence.   I see that he does not have a medical dx for either incontinence.  If you agreed that he does have these dx would you please update his dx list so that his insurance will cover these supplies?      Thank you,    Farhad Mitchell RN, N  [employer]   Phone: [phone]  Fax [phone]  [email or web address]

## 2018-05-31 ENCOUNTER — OFFICE VISIT (OUTPATIENT)
Dept: FAMILY MEDICINE | Facility: CLINIC | Age: 83
End: 2018-05-31
Payer: COMMERCIAL

## 2018-05-31 VITALS
SYSTOLIC BLOOD PRESSURE: 135 MMHG | WEIGHT: 158 LBS | TEMPERATURE: 98.6 F | OXYGEN SATURATION: 94 % | BODY MASS INDEX: 24.8 KG/M2 | HEART RATE: 66 BPM | HEIGHT: 67 IN | DIASTOLIC BLOOD PRESSURE: 88 MMHG

## 2018-05-31 DIAGNOSIS — G20.A1 PD (PARKINSON'S DISEASE) (H): ICD-10-CM

## 2018-05-31 DIAGNOSIS — E03.9 HYPOTHYROIDISM, UNSPECIFIED TYPE: ICD-10-CM

## 2018-05-31 DIAGNOSIS — R10.31 ABDOMINAL PAIN, RLQ (RIGHT LOWER QUADRANT): ICD-10-CM

## 2018-05-31 DIAGNOSIS — R21 RASH: ICD-10-CM

## 2018-05-31 DIAGNOSIS — K59.00 CONSTIPATION, UNSPECIFIED CONSTIPATION TYPE: ICD-10-CM

## 2018-05-31 DIAGNOSIS — I10 ESSENTIAL HYPERTENSION WITH GOAL BLOOD PRESSURE LESS THAN 140/90: ICD-10-CM

## 2018-05-31 DIAGNOSIS — E11.9 TYPE 2 DIABETES MELLITUS WITHOUT COMPLICATION, WITHOUT LONG-TERM CURRENT USE OF INSULIN (H): ICD-10-CM

## 2018-05-31 DIAGNOSIS — N30.01 ACUTE CYSTITIS WITH HEMATURIA: Primary | ICD-10-CM

## 2018-05-31 LAB
ANION GAP SERPL CALCULATED.3IONS-SCNC: 8 MMOL/L (ref 3–14)
BUN SERPL-MCNC: 13 MG/DL (ref 7–30)
CALCIUM SERPL-MCNC: 8.5 MG/DL (ref 8.5–10.1)
CHLORIDE SERPL-SCNC: 112 MMOL/L (ref 94–109)
CO2 SERPL-SCNC: 25 MMOL/L (ref 20–32)
CREAT SERPL-MCNC: 1.03 MG/DL (ref 0.66–1.25)
CREAT UR-MCNC: 148 MG/DL
GFR SERPL CREATININE-BSD FRML MDRD: 66 ML/MIN/1.7M2
GLUCOSE SERPL-MCNC: 104 MG/DL (ref 70–99)
HBA1C MFR BLD: 6.4 % (ref 0–5.6)
LDLC SERPL DIRECT ASSAY-MCNC: 90 MG/DL
MICROALBUMIN UR-MCNC: 95 MG/L
MICROALBUMIN/CREAT UR: 64.46 MG/G CR (ref 0–17)
POTASSIUM SERPL-SCNC: 3.8 MMOL/L (ref 3.4–5.3)
SODIUM SERPL-SCNC: 145 MMOL/L (ref 133–144)

## 2018-05-31 PROCEDURE — 99213 OFFICE O/P EST LOW 20 MIN: CPT | Performed by: FAMILY MEDICINE

## 2018-05-31 PROCEDURE — 82043 UR ALBUMIN QUANTITATIVE: CPT | Performed by: FAMILY MEDICINE

## 2018-05-31 PROCEDURE — 83721 ASSAY OF BLOOD LIPOPROTEIN: CPT | Performed by: FAMILY MEDICINE

## 2018-05-31 PROCEDURE — 36415 COLL VENOUS BLD VENIPUNCTURE: CPT | Performed by: FAMILY MEDICINE

## 2018-05-31 PROCEDURE — 83036 HEMOGLOBIN GLYCOSYLATED A1C: CPT | Performed by: FAMILY MEDICINE

## 2018-05-31 PROCEDURE — 80048 BASIC METABOLIC PNL TOTAL CA: CPT | Performed by: FAMILY MEDICINE

## 2018-05-31 RX ORDER — CEFDINIR 300 MG/1
300 CAPSULE ORAL DAILY
COMMUNITY
End: 2019-01-01

## 2018-05-31 RX ORDER — TAMSULOSIN HYDROCHLORIDE 0.4 MG/1
0.4 CAPSULE ORAL
COMMUNITY
Start: 2018-05-23 | End: 2019-01-01

## 2018-05-31 RX ORDER — CARBIDOPA AND LEVODOPA 25; 100 MG/1; MG/1
0.5 TABLET ORAL 3 TIMES DAILY
Qty: 135 TABLET | Refills: 3 | Status: SHIPPED | OUTPATIENT
Start: 2018-05-31 | End: 2019-01-01

## 2018-05-31 RX ORDER — AMOXICILLIN 250 MG
1 CAPSULE ORAL 2 TIMES DAILY
Qty: 180 TABLET | Refills: 3 | Status: SHIPPED | OUTPATIENT
Start: 2018-05-31 | End: 2019-01-01

## 2018-05-31 RX ORDER — LEVOTHYROXINE SODIUM 88 UG/1
88 TABLET ORAL DAILY
Qty: 90 TABLET | Refills: 3 | Status: SHIPPED | OUTPATIENT
Start: 2018-05-31 | End: 2019-01-01

## 2018-05-31 RX ORDER — TRIAMCINOLONE ACETONIDE 1 MG/G
CREAM TOPICAL
Qty: 30 G | Refills: 3 | Status: SHIPPED | OUTPATIENT
Start: 2018-05-31 | End: 2019-01-01

## 2018-05-31 NOTE — MR AVS SNAPSHOT
After Visit Summary   5/31/2018    Low Valadez    MRN: [id number]           Patient Information     Date Of Birth          2/4/1918        Visit Information        Provider Department      5/31/2018 9:45 AM Clif So MD; [employer] [facility]        Today's Diagnoses     Type 2 diabetes mellitus without complication, without long-term current use of insulin (H)    -  1    Abdominal pain, RLQ (right lower quadrant)        Essential hypertension with goal blood pressure less than 140/90        PD (Parkinson's disease) (H)        Hypothyroidism, unspecified type        Rash        Constipation, unspecified constipation type          Care Instructions    At [facility], we strive to deliver an exceptional experience to you, every time we see you.  If you receive a survey in the mail, please send us back your thoughts. We really do value your feedback.    Based on your medical history, these are the current health maintenance/preventive care services that you are due for (some may have been done at this visit.)  Health Maintenance Due   Topic Date Due     EYE EXAM Q1 YEAR  10/22/2013     ADVANCE DIRECTIVE PLANNING Q5 YRS  10/04/2016     A1C Q6 MO  04/25/2018     CREATININE Q1 YEAR  05/10/2018     LIPID MONITORING Q1 YEAR  05/10/2018     MICROALBUMIN Q1 YEAR  05/10/2018         Suggested websites for health information:  Www.[employer].org : Up to date and easily searchable information on multiple topics.  Www.medlineplus.gov : medication info, interactive tutorials, watch real surgeries online  Www.familydoctor.org : good info from the Academy of Family Physicians  Www.cdc.gov : public health info, travel advisories, epidemics (H1N1)  Www.aap.org : children's health info, normal development, vaccinations  Www.health.state[place] : [place] dept of health, public health issues in [place], N1N1    Your care team:                            Family Medicine  "Internal Medicine   MD Bishop Otto MD Shantel Branch-Fleming, MD Katya Georgiev PA-C Nam Ho, MD Pediatrics   LESLIE Santiago, CNP Eugenie Suh APRN CNP   MD Dinorah Garcia MD Deborah Mielke, MD Kim Thein, APRN [facility]      Clinic hours: Monday - Thursday 7 am-7 pm; Fridays 7 am-5 pm.   Urgent care: Monday - Friday 11 am-9 pm; Saturday and Sunday 9 am-5 pm.  Pharmacy : Monday -Thursday 8 am-8 pm; Friday 8 am-6 pm; Saturday and Sunday 9 am-5 pm.     Clinic: [phone]   Pharmacy: [phone]            Follow-ups after your visit        Who to contact     If you have questions or need follow up information about today's clinic visit or your schedule please contact [facility] directly at [phone].  Normal or non-critical lab and imaging results will be communicated to you by Zuldihart, letter or phone within 4 business days after the clinic has received the results. If you do not hear from us within 7 days, please contact the clinic through Zuldihart or phone. If you have a critical or abnormal lab result, we will notify you by phone as soon as possible.  Submit refill requests through Unafinance or call your pharmacy and they will forward the refill request to us. Please allow 3 business days for your refill to be completed.          Additional Information About Your Visit        Unafinance Information     [employer]finance lets you send messages to your doctor, view your test results, renew your prescriptions, schedule appointments and more. To sign up, go to [email or web address] . Click on \"Log in\" on the left side of the screen, which will take you to the Welcome page. Then click on \"Sign up Now\" on the right side of the page.     You will be asked to enter the access code listed below, as well as some personal information. Please follow the directions to create your username and password.     Your access code is: [email or web address]  Expires: " "2018 10:23 AM     Your access code will  in 90 days. If you need help or a new code, please call your [place] clinic or [phone].        Care EveryWhere ID     This is your Care EveryWhere ID. This could be used by other organizations to access your [place] medical records  [id number]        Your Vitals Were     Pulse Temperature Height Pulse Oximetry BMI (Body Mass Index)       66 98.6  F (37  C) (Oral) 5' 6.5\" (1.689 m) 94% 25.12 kg/m2        Blood Pressure from Last 3 Encounters:   18 135/88   10/25/17 107/57   05/10/17 140/60    Weight from Last 3 Encounters:   18 158 lb (71.7 kg)   05/10/17 159 lb 9.6 oz (72.4 kg)   10/20/16 156 lb 6.4 oz (70.9 kg)              We Performed the Following     Albumin Random Urine Quantitative with Creat Ratio     Basic metabolic panel     HEMOGLOBIN A1C     LDL cholesterol direct          Today's Medication Changes          These changes are accurate as of 18 10:23 AM.  If you have any questions, ask your nurse or doctor.               Stop taking these medicines if you haven't already. Please contact your care team if you have questions.     lisinopril 10 MG tablet   Commonly known as:  PRINIVIL/ZESTRIL   Stopped by:  Clif So MD                Where to get your medicines      These medications were sent to [facility] [place], [place] - [address]  [address], [facility] [address]     Phone:  [phone]     aspirin 325 MG EC tablet    carbidopa-levodopa  MG per tablet    levothyroxine 88 MCG tablet    senna-docusate 8.6-50 MG per tablet    triamcinolone 0.1 % cream         Some of these will need a paper prescription and others can be bought over the counter.  Ask your nurse if you have questions.     Bring a paper prescription for each of these medications     acetaminophen-codeine 300-30 MG per tablet               Information about OPIOIDS     PRESCRIPTION OPIOIDS: WHAT YOU NEED TO KNOW   You " have a prescription for an opioid (narcotic) pain medicine. Opioids can cause addiction. If you have a history of chemical dependency of any type, you are at a higher risk of becoming addicted to opioids. Only take this medicine after all other options have been tried. Take it for as short a time and as few doses as possible.     Do not:    Drive. If you drive while taking these medicines, you could be arrested for driving under the influence (DUI).    Operate heavy machinery    Do any other dangerous activities while taking these medicines.     Drink any alcohol while taking these medicines.      Take with any other medicines that contain acetaminophen. Read all labels carefully. Look for the word  acetaminophen  or  Tylenol.  Ask your pharmacist if you have questions or are unsure.    Store your pills in a secure place, locked if possible. We will not replace any lost or stolen medicine. If you don t finish your medicine, please throw away (dispose) as directed by your pharmacist. The [place] Pollution Control Agency has more information about safe disposal: https://www.pca.[facility].[place].us/living-green/managing-unwanted-medications    All opioids tend to cause constipation. Drink plenty of water and eat foods that have a lot of fiber, such as fruits, vegetables, prune juice, apple juice and high-fiber cereal. Take a laxative (Miralax, milk of magnesia, Colace, Senna) if you don t move your bowels at least every other day.          Primary Care Provider Office Phone # Fax #    Clif So [phone]       [address] [place]  [facility] [address]        Equal Access to Services     [facility]: Hadii merna ku hadasho Soomaali, waaxda luqadaha, qaybta kaalmada adeegyada, to stratton . So [facility] [phone].    ATENCIÓN: Si habla español, tiene a chandler disposición servicios gratuitos de asistencia lingüística. Llame al [phone].    We comply with applicable federal civil  rights laws and [place] laws. We do not discriminate on the basis of race, color, national origin, age, disability, sex, sexual orientation, or gender identity.            Thank you!     Thank you for choosing [facility]  for your care. Our goal is always to provide you with excellent care. Hearing back from our patients is one way we can continue to improve our services. Please take a few minutes to complete the written survey that you may receive in the mail after your visit with us. Thank you!             Your Updated Medication List - Protect others around you: Learn how to safely use, store and throw away your medicines at www.disposemymeds.org.          This list is accurate as of 5/31/18 10:23 AM.  Always use your most recent med list.                   Brand Name Dispense Instructions for use Diagnosis    ACE/ARB/ARNI NOT PRESCRIBED (INTENTIONAL)      Please choose reason not prescribed, below        acetaminophen-codeine 300-30 MG per tablet    TYLENOL #3    60 tablet    Take 1 tablet by mouth every 6 hours as needed for pain    Abdominal pain, RLQ (right lower quadrant)       aspirin 325 MG EC tablet     90 tablet    Take 1 tablet (325 mg) by mouth daily    Essential hypertension with goal blood pressure less than 140/90       carbidopa-levodopa  MG per tablet    SINEMET    135 tablet    Take 0.5 tablets by mouth 3 times daily    PD (Parkinson's disease) (H)       carboxymethylcellulose 0.5 % Soln ophthalmic solution    REFRESH PLUS    3 Bottle    Place 1 drop into both eyes 3 times daily as needed for dry eyes    Dry eyes       cefdinir 300 MG capsule    OMNICEF     Take 300 mg by mouth daily        levothyroxine 88 MCG tablet    SYNTHROID/LEVOTHROID    90 tablet    Take 1 tablet (88 mcg) by mouth daily    Hypothyroidism, unspecified type       polyethylene glycol powder    MIRALAX    510 g    Take 17 g (1 capful) by mouth daily As needed    Constipation, unspecified  constipation type       senna-docusate 8.6-50 MG per tablet    SENOKOT-S;PERICOLACE    180 tablet    Take 1 tablet by mouth 2 times daily    Constipation, unspecified constipation type       STATIN NOT PRESCRIBED (INTENTIONAL)     0 each    Statin not prescribed intentionally due to Other not needed due to difficulty swallowing medications (This option does not exclude patient from measure)        tamsulosin 0.4 MG capsule    FLOMAX     Take 0.4 mg by mouth        triamcinolone 0.1 % cream    KENALOG    30 g    Apply sparingly to affected area three times daily for 14 days.    Rash

## 2018-05-31 NOTE — PROGRESS NOTES
SUBJECTIVE:   Low Valadez is a 89 year old male who presents to clinic today for the following health issues:          Hospital Follow-up Visit:    Hospital/Nursing Home/IP Rehab Facility: [facility]  Date of Admission: 5/20  Date of Discharge: 5/23  Reason(s) for Admission: Acute cystitis with hematuria  Generalized Abdominal Pain  Generalized Weakness & Anorexia  Hyperkalemia   Acute Kidney Injury in the setting of Chronic Kidney Disease  Essential Hypertension   Diabetes Mellitus type 2   Parkinson's: on sinemet              Problems taking medications regularly:  None       Medication changes since discharge: d/c lisinopril       Problems adhering to non-medication therapy:  None    Summary of hospitalization:  [facility] information obtained and reviewed  Diagnostic Tests/Treatments reviewed.  Follow up needed: none  Other Healthcare Providers Involved in Patient s Care:         None  Update since discharge: improved.     Post Discharge Medication Reconciliation: discharge medications reconciled, continue medications without change.  Plan of care communicated with patient     Coding guidelines for this visit:  Type of Medical   Decision Making Face-to-Face Visit       within 7 Days of discharge Face-to-Face Visit        within 14 days of discharge   Moderate Complexity 26800 68199   High Complexity 25900 50118            Problem list and histories reviewed & adjusted, as indicated.  Additional history: as documented    Patient Active Problem List   Diagnosis     Cholelithiases     Kidney stones     Health Care Home     Advanced directives, counseling/discussion     Parkinsonism (H)     Vitamin D deficiency     Vitamin B12 deficiency (non anemic)     Elevated ferritin level     Light-headedness     Dermatochalasis, mod, ou     Posterior vitreous detachment, ou     Cataract     Hyperlipidemia LDL goal <100     Overweight (BMI 25.0-29.9)     Cerebral infarction due to occlusion or stenosis of left middle cerebral  "artery     Essential hypertension with goal blood pressure less than 140/90     Constipation, unspecified constipation type     Hypothyroidism, unspecified type     Type 2 diabetes mellitus without complication, without long-term current use of insulin (H)     Urinary incontinence without sensory awareness     Full incontinence of feces     Past Surgical History:   Procedure Laterality Date     NO HISTORY OF SURGERY         Social History   Substance Use Topics     Smoking status: Never Smoker     Smokeless tobacco: Never Used     Alcohol use No     Family History   Problem Relation Age of Onset     Thyroid Disease Other      CANCER No family hx of      DIABETES No family hx of      Hypertension No family hx of      CEREBROVASCULAR DISEASE No family hx of      Glaucoma No family hx of      Macular Degeneration No family hx of            Reviewed and updated as needed this visit by clinical staff  Tobacco  Allergies  Meds  Med Hx  Surg Hx  Fam Hx  Soc Hx      Reviewed and updated as needed this visit by Provider         ROS:  Constitutional, HEENT, cardiovascular, pulmonary, GI, , musculoskeletal, neuro, skin, endocrine and psych systems are negative, except as otherwise noted.    OBJECTIVE:     /88  Pulse 66  Temp 98.6  F (37  C) (Oral)  Ht 5' 6.5\" (1.689 m)  Wt 158 lb (71.7 kg)  SpO2 94%  BMI 25.12 kg/m2  Body mass index is 25.12 kg/(m^2).  GENERAL: healthy, alert and no distress  NECK: no adenopathy, no asymmetry, masses, or scars and thyroid normal to palpation  RESP: lungs clear to auscultation - no rales, rhonchi or wheezes  CV: regular rate and rhythm, normal S1 S2, no S3 or S4, no murmur, click or rub, no peripheral edema and peripheral pulses strong  ABDOMEN: soft, nontender, no hepatosplenomegaly, no masses and bowel sounds normal  MS: no gross musculoskeletal defects noted, no edema  Diabetic foot exam: normal DP and PT pulses, no trophic changes or ulcerative lesions and normal sensory " exam    Diagnostic Test Results:  none     ASSESSMENT/PLAN:     (N30.01) Acute cystitis with hematuria  (primary encounter diagnosis)  Comment: improving, feeling a little stronger  Plan: continue with Omnicef, full course.    (E11.9) Type 2 diabetes mellitus without complication, without long-term current use of insulin (H)  Comment: controlled.  Plan: HEMOGLOBIN A1C, Albumin Random Urine         Quantitative with Creat Ratio, Basic metabolic         panel, LDL cholesterol direct            (I10) Essential hypertension with goal blood pressure less than 140/90  Comment: borderline, bp at goal at home  Plan: aspirin 325 MG EC tablet        Recently lisinopril d/c's on discharge due to worsening renal function and hyperkalemia. Okay to d/c at this time.    (R10.31) Abdominal pain, RLQ (right lower quadrant)  Comment:   Plan: acetaminophen-codeine (TYLENOL #3) 300-30 MG         per tablet        Needed refills.    (G20) PD (Parkinson's disease) (H)  Comment: worsening, unable to ambulate.  Plan: carbidopa-levodopa (SINEMET)  MG per         tablet        Has home cares.    (E03.9) Hypothyroidism, unspecified type  Comment:   Plan: levothyroxine (SYNTHROID/LEVOTHROID) 88 MCG         tablet            (R21) Rash  Comment:   Plan: triamcinolone (KENALOG) 0.1 % cream            (K59.00) Constipation, unspecified constipation type  Comment:   Plan: senna-docusate (SENOKOT-S;PERICOLACE) 8.6-50 MG        per tablet                Work on weight loss  Regular exercise  See Patient Instructions    Clif So MD, MD  [facility]

## 2018-06-07 ENCOUNTER — PATIENT OUTREACH (OUTPATIENT)
Dept: GERIATRIC MEDICINE | Facility: CLINIC | Age: 83
End: 2018-06-07

## 2018-06-07 NOTE — PROGRESS NOTES
Order placed with [employer] (p: [phone]; f: [phone]) for Chux & Gloves.  Order placed on 05/25/2018. Access updated.  As required, authorization submitted to health plan.    Per [facility], chux and gloves were delivered to client on 06/06/2018.     Mary Matos  Case Management Specialist  [facility]  [phone]

## 2018-07-31 NOTE — PROGRESS NOTES
Call placed to member to schedule a home visit appointment to complete the annual HRA and PCA assessments.  A home visit has been scheduled for 8/06/2018 at 10:00am.    Farhad Mitchell RN, PHN  [facility]   Phone: [phone]  Fax [phone]   [email or web address]

## 2018-08-07 NOTE — LETTER
[address], [address]  [place] [address]  Phone:  [phone]  Fax:  [phone]        August 7, 2018    TONG S GERRI  [address] [place] [address]    Dear Low,    Enclosed is a copy of your completed PCA Assessment and Service Plan.  This is for your records and no action is required by you.  If you have additional questions regarding your assessment please contact me at [phone]. If you feel that your needs are not being met, please contact the Clinical Supervisor at [phone].    Sincerely,      Farhad Mitchell RN  Care Coordinator  [facility]  [phone]      Enclosure:  Completed PCA assessment

## 2018-08-07 NOTE — PROGRESS NOTES
[facility] Care Coordination Contact  [facility]:  Emailed completed PCA assessment to [facility].  Faxed copy of PCA assessment to PCA Agency and mailed copy to member.  Faxed MD Communication to PCP.       Angeles Talbert  Case Management Specialist  [facility]   [phone]

## 2018-08-13 NOTE — PROGRESS NOTES
[facility] Care Coordination Contact  Received after visit chart from care coordinator.  Completed following tasks: Mailed copy of care plan to client, Updated services in access and Entered MMIS  Chart was returned to SEBASTIAN.     Angeles Talbert  Case Management Specialist  [facility]   [phone]

## 2018-08-13 NOTE — LETTER
August 13, 2018    TONG S GERRI  [address]  [place] [address]    Dear Low,     At [facility], we re dedicated to improving the health and well-being of our members.  Enclosed you will find the Comprehensive Care Plan that was developed with you on 08/06/2018. Please review the Care Plan carefully.    As a reminder, some of the things we discussed at your visit include:    Ways to improve or maintain your physical health such as walking 20 minutes a day.     Ways to reduce the risk of falls.     Health care needs you may have.     Don t forget to contact your care coordinator if you:    Have been hospitalized or plan to be hospitalized.     Have experienced a fall.      Have experienced a change in physical health, which may include bladder control and pain issues.      Are experiencing emotional problems.     If you do not agree with your Care Plan, have questions about it, or have experienced a change in your needs, please contact me, your care coordinator, at [phone]. If you are hearing impaired, please call the [place] Relay at 291 or [phone] (bhiadq-hc-whdcqa relay service).    Sincerely,      Farhad Mitchell RN  [employer]     [facility] is a health plan that contracts with both Medicare and the [place] Medical Assistance (Medicaid) program to provide benefits of both programs to enrollees. Enrollment in [facility] depends on contract renewal.    MSC+ [id number] IA ([id number])                                                  (12/16)

## 2019-01-01 ENCOUNTER — TRANSFERRED RECORDS (OUTPATIENT)
Dept: HEALTH INFORMATION MANAGEMENT | Facility: CLINIC | Age: 84
End: 2019-01-01

## 2019-01-01 ENCOUNTER — TELEPHONE (OUTPATIENT)
Dept: FAMILY MEDICINE | Facility: CLINIC | Age: 84
End: 2019-01-01

## 2019-01-01 ENCOUNTER — ALLIED HEALTH/NURSE VISIT (OUTPATIENT)
Dept: NURSING | Facility: CLINIC | Age: 84
End: 2019-01-01
Payer: COMMERCIAL

## 2019-01-01 ENCOUNTER — PATIENT OUTREACH (OUTPATIENT)
Dept: GERIATRIC MEDICINE | Facility: CLINIC | Age: 84
End: 2019-01-01

## 2019-01-01 ENCOUNTER — OFFICE VISIT (OUTPATIENT)
Dept: FAMILY MEDICINE | Facility: CLINIC | Age: 84
End: 2019-01-01
Payer: COMMERCIAL

## 2019-01-01 VITALS
SYSTOLIC BLOOD PRESSURE: 137 MMHG | TEMPERATURE: 98.3 F | RESPIRATION RATE: 16 BRPM | DIASTOLIC BLOOD PRESSURE: 66 MMHG | HEART RATE: 73 BPM | OXYGEN SATURATION: 98 %

## 2019-01-01 VITALS
HEIGHT: 67 IN | WEIGHT: 137 LBS | BODY MASS INDEX: 21.5 KG/M2 | OXYGEN SATURATION: 99 % | DIASTOLIC BLOOD PRESSURE: 75 MMHG | HEART RATE: 75 BPM | SYSTOLIC BLOOD PRESSURE: 139 MMHG | TEMPERATURE: 97.5 F

## 2019-01-01 VITALS
HEART RATE: 59 BPM | OXYGEN SATURATION: 100 % | SYSTOLIC BLOOD PRESSURE: 148 MMHG | DIASTOLIC BLOOD PRESSURE: 78 MMHG | TEMPERATURE: 96.6 F

## 2019-01-01 DIAGNOSIS — Z11.1 SCREENING EXAMINATION FOR PULMONARY TUBERCULOSIS: ICD-10-CM

## 2019-01-01 DIAGNOSIS — G20.A1 PD (PARKINSON'S DISEASE) (H): ICD-10-CM

## 2019-01-01 DIAGNOSIS — K59.00 CONSTIPATION, UNSPECIFIED CONSTIPATION TYPE: ICD-10-CM

## 2019-01-01 DIAGNOSIS — Z00.00 ROUTINE HISTORY AND PHYSICAL EXAMINATION OF ADULT: Primary | ICD-10-CM

## 2019-01-01 DIAGNOSIS — E78.5 HYPERLIPIDEMIA LDL GOAL <100: ICD-10-CM

## 2019-01-01 DIAGNOSIS — E11.9 TYPE 2 DIABETES MELLITUS WITHOUT COMPLICATION, WITHOUT LONG-TERM CURRENT USE OF INSULIN (H): ICD-10-CM

## 2019-01-01 DIAGNOSIS — E03.9 HYPOTHYROIDISM, UNSPECIFIED TYPE: ICD-10-CM

## 2019-01-01 DIAGNOSIS — R52 PAIN: ICD-10-CM

## 2019-01-01 DIAGNOSIS — Z23 NEED FOR PROPHYLACTIC VACCINATION AND INOCULATION AGAINST INFLUENZA: ICD-10-CM

## 2019-01-01 DIAGNOSIS — G20.C PRIMARY PARKINSONISM (H): ICD-10-CM

## 2019-01-01 DIAGNOSIS — R19.00 PULSATILE ABDOMINAL MASS: Primary | ICD-10-CM

## 2019-01-01 DIAGNOSIS — R31.9 HEMATURIA, UNSPECIFIED TYPE: Primary | ICD-10-CM

## 2019-01-01 DIAGNOSIS — I10 ESSENTIAL HYPERTENSION WITH GOAL BLOOD PRESSURE LESS THAN 140/90: ICD-10-CM

## 2019-01-01 DIAGNOSIS — K21.9 GASTROESOPHAGEAL REFLUX DISEASE WITHOUT ESOPHAGITIS: ICD-10-CM

## 2019-01-01 DIAGNOSIS — Z23 FLU VACCINE NEED: ICD-10-CM

## 2019-01-01 LAB
PPDINDURATION: 0 MM (ref 0–0)
PPDREDNESS: 0 MM

## 2019-01-01 PROCEDURE — 99213 OFFICE O/P EST LOW 20 MIN: CPT | Performed by: FAMILY MEDICINE

## 2019-01-01 PROCEDURE — 90662 IIV NO PRSV INCREASED AG IM: CPT | Performed by: FAMILY MEDICINE

## 2019-01-01 PROCEDURE — G0008 ADMIN INFLUENZA VIRUS VAC: HCPCS | Performed by: FAMILY MEDICINE

## 2019-01-01 PROCEDURE — 99397 PER PM REEVAL EST PAT 65+ YR: CPT | Mod: 25 | Performed by: FAMILY MEDICINE

## 2019-01-01 PROCEDURE — 86580 TB INTRADERMAL TEST: CPT | Performed by: FAMILY MEDICINE

## 2019-01-01 PROCEDURE — 99207 C FOOT EXAM  NO CHARGE: CPT | Mod: 25 | Performed by: FAMILY MEDICINE

## 2019-01-01 RX ORDER — ACETAMINOPHEN 325 MG/1
325-650 TABLET ORAL EVERY 6 HOURS PRN
Qty: 180 TABLET | Refills: 3 | Status: SHIPPED | OUTPATIENT
Start: 2019-01-01

## 2019-01-01 RX ORDER — CARBIDOPA AND LEVODOPA 25; 100 MG/1; MG/1
0.5 TABLET ORAL 3 TIMES DAILY
Qty: 135 TABLET | Refills: 3 | Status: SHIPPED | OUTPATIENT
Start: 2019-01-01

## 2019-01-01 RX ORDER — ACETAMINOPHEN 325 MG/1
325-650 TABLET ORAL EVERY 6 HOURS PRN
Qty: 180 TABLET | Refills: 3 | Status: SHIPPED | OUTPATIENT
Start: 2019-01-01 | End: 2019-01-01

## 2019-01-01 RX ORDER — LEVOTHYROXINE SODIUM 88 UG/1
88 TABLET ORAL DAILY
Qty: 90 TABLET | Refills: 3 | Status: SHIPPED | OUTPATIENT
Start: 2019-01-01

## 2019-01-01 RX ORDER — AMOXICILLIN 250 MG
1 CAPSULE ORAL 2 TIMES DAILY
Qty: 180 TABLET | Refills: 3 | Status: SHIPPED | OUTPATIENT
Start: 2019-01-01

## 2019-01-01 ASSESSMENT — ACTIVITIES OF DAILY LIVING (ADL)
DEPENDENT_IADLS:: CLEANING;COOKING;LAUNDRY;SHOPPING;MEAL PREPARATION;MEDICATION MANAGEMENT;MONEY MANAGEMENT;TRANSPORTATION;INCONTINENCE

## 2019-01-01 ASSESSMENT — MIFFLIN-ST. JEOR: SCORE: 1182.12

## 2019-01-01 ASSESSMENT — PAIN SCALES - GENERAL: PAINLEVEL: NO PAIN (0)

## 2019-01-10 NOTE — PROGRESS NOTES
1/8/2019  Anibal (son) called requesting to move Tong Shoua to Adult Foster Care home in [place].  He and family feel client s needs are becoming too much for the family to provide because everyone work full time.  They want to send him to a facility that can provide 24/7 care for client.  Anibal has visited one AF home in [place] and likes it.  He would like to move his father their.  informed him that client is not open to EW as of now and a new HRA will need to be completed in order to open him to EW.  Anibal is in agreement.  A home visit has been scheduled for 1/15/2019.      Farhad Mitchell RN, N  [facility] Care Coordinator  Phone: [phone]  Fax [phone]   [email or web address]

## 2019-01-16 NOTE — PROGRESS NOTES
[facility] Care Coordination Contact      [facility] Home Visit Assessment     Home visit for Health Risk Assessment with Low Valadez completed on January 15, 2019    Type of residence:: Private home - stairs  Current living arrangement:: I live in a private home with family     Assessment completed with:: Patient    Current Care Plan  Member currently receiving the following home care services:   N/A  Member currently receiving the following community resources: PCA    Medication Review  Medication reconciliation completed in Epic: Yes  Medication set-up & administration: Family/informal caregiver sets up weekly.  Family caregiver administers medications.  Medication Risk Assessment Medication (1 or more, place referral to MTM): N/A: No risk factors identified  MTM Referral Placed: No: No risk factors idenified    Mental/Behavioral Health   Depression Screening: Low declined to complete PHQ assessment flowsheet.  Mental health DX:: No      Mental Health Diagnosis: No  Mental Health Services: None: No further intervention needed at this time.    Falls Assessment:   Fallen 2 or more times in the past year?: No   Any fall with injury in the past year?: No    ADL/IADL Dependencies:   Dependent ADLs:: Ambulation-walker, Bathing, Grooming, Eating, Dressing, Positioning, Transfers, Wheelchair-with assist, Toileting, Incontinence  Dependent IADLs:: Cleaning, Cooking, Laundry, Shopping, Meal Preparation, Medication Management, Money Management, Transportation, Incontinence    [facility] Health Plan sponsored benefits: Shared information re: Silver Sneakers/gym memberships, ASA, Calcium +D.    PCA Assessment completed at visit: No.     Elderly Waiver Eligibility: Yes-will open to EW    Care Plan & Recommendations: Low would like to move to adult foster care home facility where he can receive 24/7 cares and supervision.    See CC for detailed assessment information.    Follow-Up Plan: Member informed of future  contact, plan to f/u with member with a 6 month telephone assessment.  Contact information shared with member and family, encouraged member to call with any questions or concerns at any time.    [place] care continuum providers: Please refer to Health Care Home on the Epic Problem List to view this patient's [facility] Care Plan Summary.    Farhad Mitchell RN, PHN  [facility] Care Coordinator  Phone: [phone]  Fax [phone]   [email or web address]

## 2019-01-23 NOTE — PROGRESS NOTES
Injectable Influenza Immunization Documentation    1.  Is the person to be vaccinated sick today?   No    2. Does the person to be vaccinated have an allergy to a component   of the vaccine?   No  Egg Allergy Algorithm Link    3. Has the person to be vaccinated ever had a serious reaction   to influenza vaccine in the past?   No    4. Has the person to be vaccinated ever had Guillain-Barré syndrome?   No    Form completed by SonAnibal       The patient is asked the following questions today and these are his answers:    -Have you had a mantoux administered in the past 30 days?    No  -Have you had a previous positive Mantoux.  No  -Have you received BCG in the past.  No  -Have you had a live vaccine  (MMR, Varicella, OPV, Yellow Fever) in the last 6 weeks.  No  -Have you had and active  viral or bacterial infection in the past 6 weeks.  No  -Have you received corticosteroids or immunosuppressive agents in the past 6 weeks.  No  -Have you been diagnosed with HIV?  No  -Do you have a maglinancy?  No

## 2019-01-23 NOTE — PATIENT INSTRUCTIONS
Preventive Health Recommendations:     See your health care provider every year to    Review health changes.     Discuss preventive care.      Review your medicines if your doctor has prescribed any.      Talk with your health care provider about whether you should have a test to screen for prostate cancer (PSA).    Every 3 years, have a diabetes test (fasting glucose). If you are at risk for diabetes, you should have this test more often.    Every 5 years, have a cholesterol test. Have this test more often if you are at risk for high cholesterol or heart disease.     Every 10 years, have a colonoscopy. Or, have a yearly FIT test (stool test). These exams will check for colon cancer.    Talk to with your health care provider about screening for Abdominal Aortic Aneurysm if you have a family history of AAA or have a history of smoking.    Shots:     Get a flu shot each year.     Get a tetanus shot every 10 years.     Talk to your doctor about your pneumonia vaccines. There are now two you should receive - Pneumovax (PPSV 23) and Prevnar (PCV 13).     Talk to your pharmacist about a shingles vaccine.     Talk to your doctor about the hepatitis B vaccine.  Nutrition:     Eat at least 5 servings of fruits and vegetables each day.     Eat whole-grain bread, whole-wheat pasta and brown rice instead of white grains and rice.     Get adequate Calcium and Vitamin D.   Lifestyle    Exercise for at least 150 minutes a week (30 minutes a day, 5 days a week). This will help you control your weight and prevent disease.     Limit alcohol to one drink per day.     No smoking.     Wear sunscreen to prevent skin cancer.    See your dentist every six months for an exam and cleaning.    See your eye doctor every 1 to 2 years to screen for conditions such as glaucoma, macular degeneration, cataracts, etc.    Personalized Prevention Plan  You are due for the preventive services outlined below.  Your care team is available to assist you  in scheduling these services.  If you have already completed any of these items, please share that information with your care team to update in your medical record.  Health Maintenance Due   Topic Date Due     Zoster (Chicken Pox) Vaccine (2 of 3) 05/19/2009     Eye Exam - yearly  10/22/2013     Discuss Advance Directive Planning  10/04/2016     Flu Vaccine (1) 09/01/2018     A1C (Diabetes) Lab - every 6 months  11/30/2018       At [facility], we strive to deliver an exceptional experience to you, every time we see you.  If you receive a survey in the mail, please send us back your thoughts. We really do value your feedback.    Based on your medical history, these are the current health maintenance/preventive care services that you are due for (some may have been done at this visit.)  Health Maintenance Due   Topic Date Due     ZOSTER IMMUNIZATION (2 of 3) 05/19/2009     EYE EXAM Q1 YEAR  10/22/2013     ADVANCE DIRECTIVE PLANNING Q5 YRS  10/04/2016     INFLUENZA VACCINE (1) 09/01/2018     A1C Q6 MO  11/30/2018         Suggested websites for health information:  Www.[employer] : Up to date and easily searchable information on multiple topics.  Www.medlineplus.gov : medication info, interactive tutorials, watch real surgeries online  Www.familydoctor.org : good info from the Academy of Family Physicians  Www.cdc.gov : public health info, travel advisories, epidemics (H1N1)  Www.aap.org : children's health info, normal development, vaccinations  Www.health[place] : [place] dept of health, public health issues in [place], N1N1    Your care team:                            Family Medicine Internal Medicine   MD Bishop Otto MD Shantel Branch-Fleming, MD Katya Georgiev PA-C Nam Ho, MD Pediatrics   LESLEI Santiago, MD Dinorah Sequeira CNP, MD Deborah Mielke, MD Kim Thein, APRN CNP      Clinic hours: Monday -  Thursday 7 am-7 pm; Fridays 7 am-5 pm.   Urgent care: Monday - Friday 11 am-9 pm; Saturday and Sunday 9 am-5 pm.  Pharmacy : Monday -Thursday 8 am-8 pm; Friday 8 am-6 pm; Saturday and Sunday 9 am-5 pm.     Clinic: [phone]   Pharmacy: [phone]

## 2019-01-23 NOTE — PROGRESS NOTES
"  SUBJECTIVE:   Low Valadez is a 89 year old male who presents for Preventive Visit.  Are you in the first 12 months of your Medicare Part B coverage?  No    Physical Health:    In general, how would you rate your overall physical health? poor    Outside of work, how many days during the week do you exercise? none    Outside of work, approximately how many minutes a day do you exercise?not applicable    If you drink alcohol do you typically have >3 drinks per day or >7 drinks per week? No    Do you usually eat at least 4 servings of fruit and vegetables a day, include whole grains & fiber and avoid regularly eating high fat or \"junk\" foods? NO, two servings    Do you have any problems taking medications regularly?  No    Do you have any side effects from medications? none    Needs assistance for the following daily activities: telephone use, transportation, shopping, preparing meals, housework, bathing, laundry, money management and taking medicine    Which of the following safety concerns are present in your home?  none identified     Hearing impairment: No    In the past 6 months, have you been bothered by leaking of urine? yes    Mental Health:    In general, how would you rate your overall mental or emotional health? poor  PHQ-2 Score:      Do you feel safe in your environment? NA    Do you have a Health Care Directive? No: Advance care planning was reviewed with patient; patient declined at this time.    Additional concerns to address?  YES    Fall risk:  Fall Risk Assessment not completed.  click delete button to remove this line now  Cognitive Screening: Not appropriate.     Do you have sleep apnea, excessive snoring or daytime drowsiness?: no        Reviewed and updated as needed this visit by clinical staff  Tobacco  Allergies  Meds  Med Hx  Surg Hx  Fam Hx  Soc Hx        Reviewed and updated as needed this visit by Provider        Social History     Tobacco Use     Smoking status: Never Smoker     " "Smokeless tobacco: Never Used   Substance Use Topics     Alcohol use: No                           Current providers sharing in care for this patient include:   Patient Care Team:  Clif So MD as PCP - General (Family Practice)  Clif So MD as PCP - Assigned PCP  Farhad Mitchell, RN as Lead Care Coordinator  Marci Green as Other (see comments)  Trisha Henao  [facility] ([facility] HEALTH AGENCY ([facility] ([place]))    The following health maintenance items are reviewed in Epic and correct as of today:  Health Maintenance   Topic Date Due     ZOSTER IMMUNIZATION (2 of 3) 05/19/2009     EYE EXAM Q1 YEAR  10/22/2013     ADVANCE DIRECTIVE PLANNING Q5 YRS  10/04/2016     INFLUENZA VACCINE (1) 09/01/2018     A1C Q6 MO  11/30/2018     TSH W/ FREE T4 REFLEX Q2 YEAR  05/10/2019     FOOT EXAM Q1 YEAR  05/31/2019     CREATININE Q1 YEAR  05/31/2019     LIPID MONITORING Q1 YEAR  05/31/2019     MICROALBUMIN Q1 YEAR  05/31/2019     PHQ-2 Q1 YR  08/06/2019     DTAP/TDAP/TD IMMUNIZATION (2 - Td) 11/18/2019     FALL RISK ASSESSMENT  01/15/2020     IPV IMMUNIZATION  Aged Out     MENINGITIS IMMUNIZATION  Aged Out     Labs reviewed in EPIC    ROS:  Constitutional, HEENT, cardiovascular, pulmonary, GI, , musculoskeletal, neuro, skin, endocrine and psych systems are negative, except as otherwise noted.    OBJECTIVE:   /75   Pulse 75   Temp 97.5  F (36.4  C) (Tympanic)   Ht 1.689 m (5' 6.5\")   Wt 62.1 kg (137 lb)   SpO2 99%   BMI 21.78 kg/m   Estimated body mass index is 21.78 kg/m  as calculated from the following:    Height as of this encounter: 1.689 m (5' 6.5\").    Weight as of this encounter: 62.1 kg (137 lb).  EXAM:   GENERAL: healthy, alert and no distress  NECK: no adenopathy, no asymmetry, masses, or scars and thyroid normal to palpation  RESP: lungs clear to auscultation - no rales, rhonchi or wheezes  CV: regular rate and rhythm, normal S1 S2, no S3 or S4, no murmur, click or rub, no peripheral edema and " "peripheral pulses strong  ABDOMEN: soft, nontender, no hepatosplenomegaly, no masses and bowel sounds normal  MS: no gross musculoskeletal defects noted, no edema  Diabetic foot exam: normal DP and PT pulses, no trophic changes or ulcerative lesions and normal sensory exam    Diagnostic Test Results:  none     ASSESSMENT / PLAN:   1. Routine history and physical examination of adult  As below.    2. Type 2 diabetes mellitus without complication, without long-term current use of insulin (H)  Controlled.  - HEMOGLOBIN A1C  - TSH with free T4 reflex  - FOOT EXAM  - Basic metabolic panel; Future  - Albumin Random Urine Quantitative with Creat Ratio    3. Essential hypertension with goal blood pressure less than 140/90  Controlled.  - Basic metabolic panel; Future    4. Hyperlipidemia LDL goal <100  Controlled.  - LDL cholesterol direct    5. Primary Parkinsonism (H)  End stage. Patient follows Neurology.    6. Flu vaccine need    - FLU VACCINE, INCREASED ANTIGEN, PRESV FREE  - ADMIN 1st VACCINE    7. Screening examination for pulmonary tuberculosis    - TB INTRADERMAL TEST      End of Life Planning:  Patient currently has an advanced directive: No.  I have verified the patient's ablity to prepare an advanced directive/make health care decisions.  Literature was provided to assist patient in preparing an advanced directive.    COUNSELING:  Reviewed preventive health counseling, as reflected in patient instructions       Regular exercise       Healthy diet/nutrition    BP Readings from Last 1 Encounters:   01/23/19 149/55     Estimated body mass index is 21.78 kg/m  as calculated from the following:    Height as of this encounter: 1.689 m (5' 6.5\").    Weight as of this encounter: 62.1 kg (137 lb).           reports that  has never smoked. he has never used smokeless tobacco.      Appropriate preventive services were discussed with this patient, including applicable screening as appropriate for cardiovascular disease, " diabetes, osteopenia/osteoporosis, and glaucoma.  As appropriate for age/gender, discussed screening for colorectal cancer, prostate cancer, breast cancer, and cervical cancer. Checklist reviewing preventive services available has been given to the patient.    Reviewed patients plan of care and provided an AVS. The Basic Care Plan (routine screening as documented in Health Maintenance) for Low meets the Care Plan requirement. This Care Plan has been established and reviewed with the Patient.    Counseling Resources:  ATP IV Guidelines  Pooled Cohorts Equation Calculator  Breast Cancer Risk Calculator  FRAX Risk Assessment  ICSI Preventive Guidelines  Dietary Guidelines for Americans, 2010  USDA's MyPlate  ASA Prophylaxis  Lung CA Screening    Clif So MD, MD  [facility]

## 2019-01-25 PROBLEM — Z11.1 SCREENING EXAMINATION FOR PULMONARY TUBERCULOSIS: Status: ACTIVE | Noted: 2019-01-01

## 2019-01-25 NOTE — LETTER
[address] [address]  [phone]          1/25/2019          To Whom it May Concern:     Low Valadez, male, 2/4/1918 has had a mantoux on 1/23/19.      Mantoux result is NEGATIVE:  No results found for: 0 PPDREDNESS, 0 PPDINDURATION      Please contact me for questions or concerns.        Sincerely,    Usha Rizvi RN

## 2019-01-25 NOTE — PROGRESS NOTES
Mantoux results: No induration.  No swelling.  No redness.      present and family member present. All communication to patient done by .  Printed copy of results/letter and sent with patient.     Usha Rizvi RN

## 2019-01-31 NOTE — PROGRESS NOTES
[facility] Care Coordination Contact    Received after visit chart from care coordinator.  Completed following tasks: Mailed copy of care plan to client, Updated services in access and Entered MMIS  Chart was returned to CC.    and Provider Signature - No POC Shared:  Member indicates that they do not want their POC shared with any EW providers.     Darlin Zhang  Care Management Specialist  [facility]  [phone]

## 2019-01-31 NOTE — LETTER
January 31, 2019      TONG S GERRI  [address]  [place] [address]      Dear Tong:    At [facility], we are dedicated to improving your health and well-being. Enclosed is the Comprehensive Care Plan that we developed with you on 1/15/2019. Please review the Care Plan carefully.    As a reminder, some of the things we discussed at your visit include:    Your physical and mental health    Ways to reduce falls    Health care needs you may have    Don t forget to contact your care coordinator if you:    Have been hospitalized or plan to be hospitalized     Have had a fall     Have experienced a change in physical health    Are experiencing emotional problems     If you do not agree with your Care Plan, have questions about it, or have experienced a change in your needs, please call me at [phone]. If you are hearing impaired, please call the [place] Relay at 165 or [phone] (zxzush-bi-odhfls relay service).    Sincerely,    Farhad Mitchell RN    E-mail: [email or web address]  Phone: [phone]      [facility] ([facility]) is a health plan that contracts with both Medicare and the [place] Medical Assistance (Medicaid) program to provide benefits of both programs to enrollees. Enrollment in [facility] depends on contract renewal.    [facility]+[id number])     [id number] (11/18)

## 2019-01-31 NOTE — PROGRESS NOTES
[facility] Care Coordination Contact    Mailed copy of CL tool to member, faxed copy to AL facility, uploaded into [employer] and submitted authorization to health plan.    Darlin Zhang  Care Management Specialist  [facility]  [phone]

## 2019-02-21 NOTE — TELEPHONE ENCOUNTER
Reason for Call:  Other     Detailed comments: Patient will be going into Hospice through [facility] and asking if Dr So will follow patient and sign death certificate?    Phone Number Patient can be reached at: Other phone number:   [facility] (HOME CARE) [phone]          Best Time: any    Can we leave a detailed message on this number? YES    Call taken on 2/21/2019 at 1:59 PM by Rose Marie Mcnair

## 2019-02-21 NOTE — TELEPHONE ENCOUNTER
Returned call to Anna Marie, was told by staff, Anna Marie will be paged and return my call.  Corrina Abbott MA    This writer attempted to contact Anna Marie on 02/21/19      Reason for call ok orders and left message.      If patient calls back:   1st floor [place] Care Team (MA/TC) called. Inform patient that someone from the team will contact them, document that pt called and route to care team.         Corrina Abbott MA

## 2019-02-21 NOTE — TELEPHONE ENCOUNTER
Vernell returned call from [facility]. Informed her of provider's message.  Corrina Abbott MA

## 2019-02-27 NOTE — PROGRESS NOTES
SUBJECTIVE:   Low Valadez is a 89 year old male who presents to clinic today for the following health issues:          Hospital Follow-up Visit:    Hospital/Nursing Home/ Rehab Facility: [facility]  Date of Admission: 2/20  Date of Discharge: 2/21  Reason(s) for Admission: Hematuria            Problems taking medications regularly:  None       Medication changes since discharge: None       Problems adhering to non-medication therapy:  None    Summary of hospitalization:  CareEverywhere information obtained and reviewed  Diagnostic Tests/Treatments reviewed.  Follow up needed: none  Other Healthcare Providers Involved in Patient s Care:         None  Update since discharge: improved.     Post Discharge Medication Reconciliation: discharge medications reconciled and changed, per note/orders (see AVS).  Plan of care communicated with patient     Coding guidelines for this visit:  Type of Medical   Decision Making Face-to-Face Visit       within 7 Days of discharge Face-to-Face Visit        within 14 days of discharge   Moderate Complexity [id number]   High Complexity [id number]                Problem list and histories reviewed & adjusted, as indicated.  Additional history: as documented    Patient Active Problem List   Diagnosis     Cholelithiases     Kidney stones     Health Care Home     Advanced directives, counseling/discussion     Parkinsonism (H)     Vitamin D deficiency     Vitamin B12 deficiency (non anemic)     Elevated ferritin level     Light-headedness     Dermatochalasis, mod, ou     Posterior vitreous detachment, ou     Cataract     Hyperlipidemia LDL goal <100     Overweight (BMI 25.0-29.9)     Cerebral infarction due to occlusion or stenosis of left middle cerebral artery     Essential hypertension with goal blood pressure less than 140/90     Constipation, unspecified constipation type     Hypothyroidism, unspecified type     Type 2 diabetes mellitus without complication, without long-term current  use of insulin (H)     Urinary incontinence without sensory awareness     Full incontinence of feces     Screening examination for pulmonary tuberculosis     Past Surgical History:   Procedure Laterality Date     NO HISTORY OF SURGERY         Social History     Tobacco Use     Smoking status: Never Smoker     Smokeless tobacco: Never Used   Substance Use Topics     Alcohol use: No     Family History   Problem Relation Age of Onset     Thyroid Disease Other      Cancer No family hx of      Diabetes No family hx of      Hypertension No family hx of      Cerebrovascular Disease No family hx of      Glaucoma No family hx of      Macular Degeneration No family hx of            Reviewed and updated as needed this visit by clinical staff  Tobacco  Allergies  Meds  Med Hx  Surg Hx  Fam Hx  Soc Hx      Reviewed and updated as needed this visit by Provider         ROS:  Constitutional, HEENT, cardiovascular, pulmonary, GI, , musculoskeletal, neuro, skin, endocrine and psych systems are negative, except as otherwise noted.    OBJECTIVE:     /78   Pulse 59   Temp 96.6  F (35.9  C) (Tympanic)   SpO2 100%   There is no height or weight on file to calculate BMI.  GENERAL: healthy, alert and no distress  NECK: no adenopathy, no asymmetry, masses, or scars and thyroid normal to palpation  RESP: lungs clear to auscultation - no rales, rhonchi or wheezes  CV: regular rate and rhythm, normal S1 S2, no S3 or S4, no murmur, click or rub, no peripheral edema and peripheral pulses strong  ABDOMEN: soft, nontender, no hepatosplenomegaly, no masses and bowel sounds normal  MS: no gross musculoskeletal defects noted, no edema    Diagnostic Test Results:  none     ASSESSMENT/PLAN:     1. Hematuria, unspecified type  Asymptomatic. Patient had UA which did not show UTI. No further w/u done per patient wishes. Monitor.    2. PD (Parkinson's disease) (H)  End-stage. Having more difficulty with swallowing. Risk for aspiration  pneumonia. Patient on thicken diet. Patient weaker. Discussed hospice/comfort care. Care Coordinator will further discuss this with patient.  - carbidopa-levodopa (SINEMET)  MG tablet; Take 0.5 tablets by mouth 3 times daily  Dispense: 135 tablet; Refill: 3    3. Gastroesophageal reflux disease without esophagitis    - magnesium hydroxide (MILK OF MAGNESIA) 400 MG/5ML suspension; Take 30 mLs by mouth daily as needed for constipation or heartburn  Dispense: 769 mL; Refill: 11    4. Pain    - acetaminophen (TYLENOL) 325 MG tablet; Take 1-2 tablets (325-650 mg) by mouth every 6 hours as needed for mild pain  Dispense: 180 tablet; Refill: 3    5. Hypothyroidism, unspecified type    - levothyroxine (SYNTHROID/LEVOTHROID) 88 MCG tablet; Take 1 tablet (88 mcg) by mouth daily  Dispense: 90 tablet; Refill: 3    6. Constipation, unspecified constipation type    - magnesium hydroxide (MILK OF MAGNESIA) 400 MG/5ML suspension; Take 30 mLs by mouth daily as needed for constipation or heartburn  Dispense: 769 mL; Refill: 11  - senna-docusate (SENOKOT-S/PERICOLACE) 8.6-50 MG tablet; Take 1 tablet by mouth 2 times daily  Dispense: 180 tablet; Refill: 3    See Patient Instructions    Clif So MD, MD  [facility]

## 2019-02-27 NOTE — PATIENT INSTRUCTIONS
At [facility], we strive to deliver an exceptional experience to you, every time we see you.  If you receive a survey in the mail, please send us back your thoughts. We really do value your feedback.    Based on your medical history, these are the current health maintenance/preventive care services that you are due for (some may have been done at this visit.)  Health Maintenance Due   Topic Date Due     ZOSTER IMMUNIZATION (2 of 3) 05/19/2009     EYE EXAM Q1 YEAR  10/22/2013     ADVANCE DIRECTIVE PLANNING Q5 YRS  10/04/2016     A1C Q6 MO  11/30/2018         Suggested websites for health information:  Www.[facility].org : Up to date and easily searchable information on multiple topics.  Www.medlineplus.gov : medication info, interactive tutorials, watch real surgeries online  Www.familydoctor.org : good info from the Academy of Family Physicians  Www.cdc.gov : public health info, travel advisories, epidemics (H1N1)  Www.aap.org : children's health info, normal development, vaccinations  Www.health.[facility].[place].us : [place] dept of health, public health issues in [place], N1N1    Your care team:                            Family Medicine Internal Medicine   MD Bishop Otto MD Shantel Branch-Fleming, MD Katya Georgiev PA-C Nam Ho, MD Pediatrics   LESLIE Santiago, MD Dinorah Sequeira CNP, MD Deborah Mielke, MD Kim Thein, APRN [facility]      Clinic hours: Monday - Thursday 7 am-7 pm; Fridays 7 am-5 pm.   Urgent care: Monday - Friday 11 am-9 pm; Saturday and Sunday 9 am-5 pm.  Pharmacy : Monday -Thursday 8 am-8 pm; Friday 8 am-6 pm; Saturday and Sunday 9 am-5 pm.     Clinic: [phone]   Pharmacy: [phone]

## 2019-03-08 NOTE — PROGRESS NOTES
SUBJECTIVE:   Low Valadez is a 89 year old male who presents to clinic today for the following health issues:      Bump on abdomen      Duration: few days    Description (location/character/radiation): bump on abdomen    Intensity:  moderate    Accompanying signs and symptoms: None.    History (similar episodes/previous evaluation): None    Precipitating or alleviating factors: None    Therapies tried and outcome: None       Problem list and histories reviewed & adjusted, as indicated.  Additional history: as documented    Patient Active Problem List   Diagnosis     Cholelithiases     Kidney stones     Health Care Home     Advanced directives, counseling/discussion     Parkinsonism (H)     Vitamin D deficiency     Vitamin B12 deficiency (non anemic)     Elevated ferritin level     Light-headedness     Dermatochalasis, mod, ou     Posterior vitreous detachment, ou     Cataract     Hyperlipidemia LDL goal <100     Overweight (BMI 25.0-29.9)     Cerebral infarction due to occlusion or stenosis of left middle cerebral artery     Essential hypertension with goal blood pressure less than 140/90     Constipation, unspecified constipation type     Hypothyroidism, unspecified type     Type 2 diabetes mellitus without complication, without long-term current use of insulin (H)     Urinary incontinence without sensory awareness     Full incontinence of feces     Screening examination for pulmonary tuberculosis     Past Surgical History:   Procedure Laterality Date     NO HISTORY OF SURGERY         Social History     Tobacco Use     Smoking status: Never Smoker     Smokeless tobacco: Never Used   Substance Use Topics     Alcohol use: No     Family History   Problem Relation Age of Onset     Thyroid Disease Other      Cancer No family hx of      Diabetes No family hx of      Hypertension No family hx of      Cerebrovascular Disease No family hx of      Glaucoma No family hx of      Macular Degeneration No family hx of             Reviewed and updated as needed this visit by clinical staff  Tobacco  Allergies  Meds  Med Hx  Surg Hx  Fam Hx  Soc Hx      Reviewed and updated as needed this visit by Provider         ROS:  Constitutional, HEENT, cardiovascular, pulmonary, GI, , musculoskeletal, neuro, skin, endocrine and psych systems are negative, except as otherwise noted.    OBJECTIVE:     /66 (BP Location: Right arm, Patient Position: Chair, Cuff Size: Adult Regular)   Pulse 73   Temp 98.3  F (36.8  C) (Tympanic)   Resp 16   SpO2 98%   There is no height or weight on file to calculate BMI.  GENERAL: healthy, alert and no distress  NECK: no adenopathy, no asymmetry, masses, or scars and thyroid normal to palpation  RESP: lungs clear to auscultation - no rales, rhonchi or wheezes  CV: regular rate and rhythm, normal S1 S2, no S3 or S4, no murmur, click or rub, no peripheral edema and peripheral pulses strong  ABDOMEN: soft, nontender, no hepatosplenomegaly, no masses and bowel sounds normal, pulsating mass left abdomen, no tenderness  MS: no gross musculoskeletal defects noted, no edema    Diagnostic Test Results:  none     ASSESSMENT/PLAN:     1. Pulsatile abdominal mass  Discussed likely aorta and likely normal. Possible aneurysm. With patient's age and history, no need for imaging since we would not do anything with aneurysm. Discussed this with caretaker.      See Patient Instructions    Clif So MD, MD  [facility]

## 2019-03-08 NOTE — PATIENT INSTRUCTIONS
At [facility], we strive to deliver an exceptional experience to you, every time we see you.  If you receive a survey in the mail, please send us back your thoughts. We really do value your feedback.    Based on your medical history, these are the current health maintenance/preventive care services that you are due for (some may have been done at this visit.)  Health Maintenance Due   Topic Date Due     ZOSTER IMMUNIZATION (2 of 3) 05/19/2009     EYE EXAM Q1 YEAR  10/22/2013     ADVANCE DIRECTIVE PLANNING Q5 YRS  10/04/2016     A1C Q6 MO  11/30/2018         Suggested websites for health information:  Www.[facility].org : Up to date and easily searchable information on multiple topics.  Www.medlineplus.gov : medication info, interactive tutorials, watch real surgeries online  Www.familydoctor.org : good info from the Academy of Family Physicians  Www.cdc.gov : public health info, travel advisories, epidemics (H1N1)  Www.aap.org : children's health info, normal development, vaccinations  Www.health.[facility].[place].us : [place] dept of health, public health issues in [place], N1N1    Your care team:                            Family Medicine Internal Medicine   MD Bishop Otto MD Shantel Branch-Fleming, MD Katya Georgiev PA-C Nam Ho, MD Pediatrics   LESLIE Santiago, MD Dinorah Sequeira CNP, MD Deborah Mielke, MD Kim Thein, APRN [facility]      Clinic hours: Monday - Thursday 7 am-7 pm; Fridays 7 am-5 pm.   Urgent care: Monday - Friday 11 am-9 pm; Saturday and Sunday 9 am-5 pm.  Pharmacy : Monday -Thursday 8 am-8 pm; Friday 8 am-6 pm; Saturday and Sunday 9 am-5 pm.     Clinic: [phone]   Pharmacy: [phone]

## 2019-06-11 NOTE — PROGRESS NOTES
19 T/c to Deangelo, member s daughter-in-law and his SSI Rep Payee.  She informed CC that she went to [facility] office in [place] and spoke with a representative who was able to figure out what the issue was.  According to the SSA representative the incorrect  first became an issue in 2017.  He can see that member went in to have it change it to 1918; however, the SSA rep. didn t send any communication document to Medicare for them to change it so they continue to have the incorrect  in their system.  [facility] pulls  from Medicare s system so that is why [facility] s system also has the incorrect information.  Today the SSA rep wrote a letter and faxed it to Medicare requesting them to update their system to the correct  of 1918 which he said should finally resolved this issue once and for all.  Deangelo requested to have a copy of the letter so she can keep it for record in case this becomes an issue again.  Deangelo will call Pb at [place] to inform her and have her check [facility] system again in a week or two.  Deangelo will reach out to CC if she has questions or needs assist again.    19 CC received call back from Genesis at [facility] with report that she checked with Medicare and they have his  as 1923.  They informed her to have member go to [facility] to have it correct it there as they pull their information from there.      19 CC t/c to [facility] Line, spoke to Genesis, inquired if Genesis is able to look up member s demographic in CMS system to see what they have as member s birthday since [facility] claims that they pull member s  from CMS system.  Genesis will research this and get back to CC.     19 Received call from MAICOL Farias  Clinical Care Coordination Liaison at [facility] ([phone]), who states that she spoke to [facility] enrollment regarding this issue and was told that [facility] s computer system pulls all members   from CMS s system.  Member will need to talk with CMS again to see what  birthday they have in their system and have it correct it there.  CC informed Genevieve that member s family have gone to both the [facility] and [facility] and was told that his  in their system is 1918. Family also spoke with CMS and said they need to speak to [facility] to have it corrected because they cannot change the .  Genevieve states if member just had CMS corrected the error last month then it probably has not been long enough time to have it transfer to [facility] s system, and member would need to wait it out this month.  CC request if Genevieve would be willing to email enrollment to request for them to pull up member s demographic in CMS s system since Enrollment has access to it.  Genevieve declined stating Enrollment does not have time to do this.  CC requested  two more times to send an email to [facility] and let them decide if they have time to do this quick inquiry for this member.  CC shared with Genevieve that this member and his family has had this happened to them multiple times in the past, and they are very frustrated of being  tossed around  by the bureaucratic  of the health system.  Genevieve still declined to send an email to Enrollment.  CC request for Genevieve s supervisor s name and phone # to see is she/he can assist CC and member with this issue but Genevieve refused to provide the information.      CC called and left a vm with [facility] directly to have them call CC back to discuss the case and see if they can assist.    6/10/19 Emailed [facility] Clinical Liason today regarding the incorrect birthday.     19 [place] today and [place] birth date is listed as 1918 which is the correct birth date.    Medicare #: [id number] (new)/ previous #  [id number]     19 CC t/c to Clinical Care Coordination Liaisons P: [phone], left a vm message to have someone call back to discuss this issue.     19 CC called Low Redding s daughter-in-law, who explained that this has been an ongoing issue with [facility] for  many years.  In the past when [facility] agency was not getting paid she was instructed by [facility] to  re-enroll  Low again so the birthday could be corrected.  Family had done this twice with [facility].  However, it only lasts for a short time before [facility] s computer system automatically repopulate the incorrect birth date ((2/4/1923) back. According to Deangelo, Low s birth date has always been correct at [employer] and [employer] computer system.      6/6/19 Received t/c from [facility] [employer] provider that her agency has not gotten paid since Low was admitted on 2/9/2019. Spoke to staff Xe ([phone]) who states that she spoke to [facility] Provider customer service on 6/4/2019, and they told her that CC needs to fill out a form to request for them to correct the birth date back to 2/4/1918.       Farhad Mitchell RN, PHN  [facility] Care Coordinator  Phone: [phone]  Fax [phone]   [email or web address]

## 2019-07-02 NOTE — PROGRESS NOTES
[facility] Care Coordination Contact      [facility] Six-Month Telephone Assessment    6 month telephone assessment completed on 07/02/2019.    ER visits: No  Hospitalizations: Yes, [address]  TCU stays: No  Significant health status changes: None reported.  Falls/Injuries: No  ADL/IADL changes: No  Changes in services: No    Caregiver Assessment follow up:  N/A    Goals: See POC in chart for goal progress documentation.      Will see member in 6 months for an annual health risk assessment.   Encouraged member to call CC with any questions or concerns in the meantime.         Farhad Mitchell RN, PHN  [facility] Care Coordinator  Phone: [phone]  Fax [phone]   [email or web address]

## 2019-07-08 NOTE — PROGRESS NOTES
TRANSITIONS OF CARE (BERKLEY) LOG   BERKLEY tasks should be completed by the CC within one (1) business day of notification of each transition. Follow up contact with member is required after return to their usual care setting.  Note:  If CC finds out about the transitions fifteen (15) days or more after the member has returned to their usual care setting, no BERKLEY log is needed. However, the CC should check in with the member to discuss the transition process, any changes needed to the care plan and document it in a case note.    Member Name:  Low Valadez [facility] Member ID#:  [id number]   Product: [facility] Care Coordinator Contact:  Farhad Mitchell RN Agency/County/Care System: [employer]   Transition Communication Actions from Care Management Contact   Transition #1   Notification Date: 7/08/19 Transition Date:   7/05/19 Transition From: Assisted Living,  [facility]      Is this the member s usual care setting?               yes Transition To: [facility]    [phone]  Unit: \[facility] [place] [facility]\""   Transition Type:  Unplanned  Reason for Admission/Comments:  Admission Dx: Sepsis  Writer spoke to MILIND Lyon, who reports Low transitioned to compfort care several days ago. The plan is for Low to pass away in the hospital as he is too weak to be transport. Sonali reports family is by Low's side.  Sonali will contact writer with any new update as needed.      7/10/2019 ~ received notification that Low passed away on 7/9/2019.  Writer t/c to Anibal alicia, and offered condolences to family.  CC notified PCP via EMR.    Shared CC contact info, care plan/services with receiving setting--Date completed: 7/08/2019    Notified PCP of transition--Date completed:  7/08/2019     via  EMR   Transition #2   Transition #3  (if applicable)   Notification Date:          Transition To:    Transition Date:      Transition Type:     Notified PCP -- Date completed:               Shared CC contact info,  care plan/services with receiving setting or, if applicable, home care agency--Date completed:   *Complete additional tasks below, if this transition is a return to usual care setting.      Comments:    Notification Date:      Transition To:    Transition Date:          Transition Type:    Notified PCP--Date completed:      Shared CC contact info, care plan/services with receiving setting or, if applicable, home care agency--Date completed:  *Complete additional tasks below, if this transition is a return to usual care setting.      Comments:       *Complete tasks below when the member is discharging TO their usual care setting within one (1) business day of notification.  For situations where the Care Coordinator is notified of the discharge prior to the date of discharge, the Care Coordinator must follow up with the member or designated representative to confirm that discharge actually occurred and discuss required BERKLEY tasks as outlined in the BERKLEY Instructions.  (This includes situations where it may be a  new  usual care setting for the member. (i.e., a community member who decides upon permanent nursing home placement following hospitalization and rehab).    Date completed: 7/10/2019  Communicated with member or their designated representative about the following:  care transition process; about changes to the member s health status; plan of care updates; education about transitions and how to prevent unplanned transitions/readmissions  Four Pillars for Optimal Transition:    Check  Yes  - if the member, family member and/or SNF/facility staff manages the following:    If  No  provide explanation in the comments section.          []  Yes     []  No     Does the member have a follow-up appointment scheduled with primary care or specialist? (Mental health hospitalizations--the appt. should be w/in 7 days)   []  Yes     []  No     Can the member manage their medications or is there a system in place to manage  medications (e.g. home care set-up)?         []  Yes     []  No     Can the member verbalize warning signs and symptoms to watch for and how to respond?         []  Yes     []  No     Does the member use a Personal Health Care Record?  Check  Yes  if visit summary, discharge summary, and/or healthcare summary are being used as a PHR.                                                                                                                                                                                    [] Yes      [] No      Have you updated the member s care plan?  If  No  provide explanation in comments.   Comments:  Discharge tasks does not apply because Low passed away.

## 2019-07-10 ENCOUNTER — PATIENT OUTREACH (OUTPATIENT)
Dept: GERIATRIC MEDICINE | Facility: CLINIC | Age: 84
End: 2019-07-10

## 2019-07-10 NOTE — PROGRESS NOTES
[facility] Care Coordination Contact    Notified by Marci Lewis that member passed away on 7/09/2019 at Hospital.    PCP notified. Called all providers to cancel services.    Called family member/caregiver to offer condolences.   For [facility]:  Death Notification form completed and faxed to [facility].    Chart reviewed, notes printed and this CC's encounter closed. Chart handed off to CMS to process disenrollment tasks.    Farhad Mitchell RN, PHN  [facility] Care Coordinator  Phone: [phone]  Fax [phone]   [email or web address]

## 2019-07-20 NOTE — TELEPHONE ENCOUNTER
Denied as duplicate - these were just ordered 2/28 and 3/1 by provider.    Lei Aguirre RN       DISPLAY PLAN FREE TEXT
